# Patient Record
Sex: FEMALE | Race: OTHER | Employment: UNEMPLOYED | URBAN - METROPOLITAN AREA
[De-identification: names, ages, dates, MRNs, and addresses within clinical notes are randomized per-mention and may not be internally consistent; named-entity substitution may affect disease eponyms.]

---

## 2018-10-06 ENCOUNTER — HOSPITAL ENCOUNTER (EMERGENCY)
Age: 43
Discharge: HOME | End: 2018-10-06

## 2018-10-06 VITALS
RESPIRATION RATE: 14 BRPM | WEIGHT: 261 LBS | HEART RATE: 80 BPM | DIASTOLIC BLOOD PRESSURE: 74 MMHG | OXYGEN SATURATION: 97 % | TEMPERATURE: 98.6 F | SYSTOLIC BLOOD PRESSURE: 112 MMHG

## 2018-10-06 DIAGNOSIS — L50.9 URTICARIA: Primary | ICD-10-CM

## 2018-10-06 PROCEDURE — 99282 EMERGENCY DEPT VISIT SF MDM: CPT

## 2018-10-06 PROCEDURE — 6360000002 HC RX W HCPCS: Performed by: NURSE PRACTITIONER

## 2018-10-06 PROCEDURE — 96372 THER/PROPH/DIAG INJ SC/IM: CPT

## 2018-10-06 RX ORDER — PREDNISONE 10 MG/1
TABLET ORAL
Qty: 30 TABLET | Refills: 0 | Status: SHIPPED | OUTPATIENT
Start: 2018-10-06 | End: 2018-10-06

## 2018-10-06 RX ORDER — DIPHENHYDRAMINE HCL 25 MG
25 CAPSULE ORAL EVERY 6 HOURS PRN
Qty: 20 CAPSULE | Refills: 0 | Status: SHIPPED | OUTPATIENT
Start: 2018-10-06 | End: 2018-10-06

## 2018-10-06 RX ORDER — DIPHENHYDRAMINE HCL 25 MG
25 CAPSULE ORAL EVERY 6 HOURS PRN
Qty: 20 CAPSULE | Refills: 0 | Status: SHIPPED | OUTPATIENT
Start: 2018-10-06 | End: 2019-03-31

## 2018-10-06 RX ORDER — DIPHENHYDRAMINE HCL 25 MG
25 CAPSULE ORAL EVERY 6 HOURS PRN
COMMUNITY
End: 2018-10-06

## 2018-10-06 RX ORDER — PREDNISONE 10 MG/1
TABLET ORAL
Qty: 30 TABLET | Refills: 0 | Status: SHIPPED | OUTPATIENT
Start: 2018-10-06 | End: 2019-03-31

## 2018-10-06 RX ORDER — TRIAMCINOLONE ACETONIDE 40 MG/ML
80 INJECTION, SUSPENSION INTRA-ARTICULAR; INTRAMUSCULAR ONCE
Status: COMPLETED | OUTPATIENT
Start: 2018-10-06 | End: 2018-10-06

## 2018-10-06 RX ADMIN — TRIAMCINOLONE ACETONIDE 80 MG: 40 INJECTION, SUSPENSION INTRA-ARTICULAR; INTRAMUSCULAR at 17:24

## 2018-10-06 ASSESSMENT — ENCOUNTER SYMPTOMS
NAUSEA: 0
SHORTNESS OF BREATH: 0
ABDOMINAL PAIN: 0
THROAT SWELLING: 0
PERI-ORBITAL EDEMA: 0
DIARRHEA: 0
HOARSE VOICE: 0
SORE THROAT: 0
VOMITING: 0
WHEEZING: 0

## 2018-10-06 NOTE — ED PROVIDER NOTES
[address] [facility] ED  SFT  Encounter      CHIEF COMPLAINT       Chief Complaint   Patient presents with    Rash     x 4 days-itchs       Nurses Notes reviewed and I agree except as noted in the HPI. HISTORY OF PRESENT ILLNESS   Keon Meyer is a 36 y.o. female who presents   The history is provided by the patient. Rash   Location:  Face, shoulder/arm, leg, foot and hand  Facial rash location:  Face  Quality: itchiness and redness    Quality: not blistering, not bruising, not burning, not draining, not dry, not painful, not peeling, not scaling, not swelling and not weeping    Severity:  Moderate  Onset quality:  Sudden  Duration:  3 days  Timing:  Constant  Progression:  Spreading  Chronicity:  New  Context: not animal contact, not chemical exposure, not diapers, not eggs, not exposure to similar rash, not food, not hot tub use, not insect bite/sting, not medications, not new detergent/soap, not nuts, not plant contact, not pollen, not pregnancy, not sick contacts and not sun exposure    Relieved by:  None tried  Worsened by:  Nothing  Ineffective treatments:  None tried  Associated symptoms: no abdominal pain, no diarrhea, no fatigue, no fever, no headaches, no hoarse voice, no induration, no joint pain, no myalgias, no nausea, no periorbital edema, no shortness of breath, no sore throat, no throat swelling, no tongue swelling, no URI, not vomiting and not wheezing        REVIEW OF SYSTEMS     Review of Systems   Constitutional: Negative for fatigue and fever. HENT: Negative for hoarse voice and sore throat. Respiratory: Negative for shortness of breath and wheezing. Gastrointestinal: Negative for abdominal pain, diarrhea, nausea and vomiting. Musculoskeletal: Negative for arthralgias and myalgias. Skin: Positive for rash. Neurological: Negative for headaches.        PAST MEDICAL HISTORY         Diagnosis Date    Diabetes mellitus ([address]        SURGICAL HISTORY     Patient  has a past [address] [place] [address]

## 2018-12-07 ENCOUNTER — HOSPITAL ENCOUNTER (EMERGENCY)
Age: 43
Discharge: HOME | End: 2018-12-07

## 2018-12-07 ENCOUNTER — APPOINTMENT (OUTPATIENT)
Dept: GENERAL RADIOLOGY | Age: 43
End: 2018-12-07

## 2018-12-07 VITALS
SYSTOLIC BLOOD PRESSURE: 132 MMHG | HEIGHT: 68 IN | RESPIRATION RATE: 20 BRPM | WEIGHT: 271 LBS | BODY MASS INDEX: 41.07 KG/M2 | DIASTOLIC BLOOD PRESSURE: 71 MMHG | TEMPERATURE: 98.2 F | HEART RATE: 71 BPM | OXYGEN SATURATION: 100 %

## 2018-12-07 DIAGNOSIS — M54.41 ACUTE RIGHT-SIDED LOW BACK PAIN WITH RIGHT-SIDED SCIATICA: ICD-10-CM

## 2018-12-07 DIAGNOSIS — H10.9 CONJUNCTIVITIS OF BOTH EYES, UNSPECIFIED CONJUNCTIVITIS TYPE: Primary | ICD-10-CM

## 2018-12-07 PROCEDURE — 6370000000 HC RX 637 (ALT 250 FOR IP): Performed by: PHYSICIAN ASSISTANT

## 2018-12-07 PROCEDURE — 96372 THER/PROPH/DIAG INJ SC/IM: CPT

## 2018-12-07 PROCEDURE — 6360000002 HC RX W HCPCS: Performed by: PHYSICIAN ASSISTANT

## 2018-12-07 PROCEDURE — 72110 X-RAY EXAM L-2 SPINE 4/>VWS: CPT

## 2018-12-07 PROCEDURE — 99283 EMERGENCY DEPT VISIT LOW MDM: CPT

## 2018-12-07 RX ORDER — CYCLOBENZAPRINE HCL 10 MG
10 TABLET ORAL 2 TIMES DAILY PRN
Qty: 10 TABLET | Refills: 0 | Status: SHIPPED | OUTPATIENT
Start: 2018-12-07 | End: 2018-12-17

## 2018-12-07 RX ORDER — HYDROCODONE BITARTRATE AND ACETAMINOPHEN 5; 325 MG/1; MG/1
1 TABLET ORAL ONCE
Status: COMPLETED | OUTPATIENT
Start: 2018-12-07 | End: 2018-12-07

## 2018-12-07 RX ORDER — ORPHENADRINE CITRATE 30 MG/ML
60 INJECTION INTRAMUSCULAR; INTRAVENOUS ONCE
Status: COMPLETED | OUTPATIENT
Start: 2018-12-07 | End: 2018-12-07

## 2018-12-07 RX ORDER — KETOROLAC TROMETHAMINE 30 MG/ML
30 INJECTION, SOLUTION INTRAMUSCULAR; INTRAVENOUS ONCE
Status: COMPLETED | OUTPATIENT
Start: 2018-12-07 | End: 2018-12-07

## 2018-12-07 RX ORDER — IBUPROFEN 800 MG/1
800 TABLET ORAL EVERY 8 HOURS PRN
Qty: 20 TABLET | Refills: 0 | Status: SHIPPED | OUTPATIENT
Start: 2018-12-07 | End: 2019-03-31

## 2018-12-07 RX ORDER — TOBRAMYCIN 3 MG/ML
1 SOLUTION/ DROPS OPHTHALMIC EVERY 4 HOURS
Qty: 1 BOTTLE | Refills: 0 | Status: SHIPPED | OUTPATIENT
Start: 2018-12-07 | End: 2018-12-17

## 2018-12-07 RX ADMIN — HYDROCODONE BITARTRATE AND ACETAMINOPHEN 1 TABLET: 5; 325 TABLET ORAL at 11:34

## 2018-12-07 RX ADMIN — ORPHENADRINE CITRATE 60 MG: 60 INJECTION INTRAMUSCULAR; INTRAVENOUS at 11:35

## 2018-12-07 RX ADMIN — KETOROLAC TROMETHAMINE 30 MG: 30 INJECTION, SOLUTION INTRAMUSCULAR at 11:34

## 2018-12-07 ASSESSMENT — PAIN DESCRIPTION - ORIENTATION
ORIENTATION: RIGHT;LEFT
ORIENTATION: RIGHT;LEFT

## 2018-12-07 ASSESSMENT — ENCOUNTER SYMPTOMS
RHINORRHEA: 0
ABDOMINAL PAIN: 0
NAUSEA: 0
EYE PAIN: 0
SORE THROAT: 0
EYE DISCHARGE: 1
BACK PAIN: 1
EYE ITCHING: 1
COUGH: 0
DIARRHEA: 0
VOMITING: 0
EYE REDNESS: 1
SHORTNESS OF BREATH: 0
PHOTOPHOBIA: 0

## 2018-12-07 ASSESSMENT — PAIN DESCRIPTION - PAIN TYPE
TYPE: ACUTE PAIN
TYPE: ACUTE PAIN

## 2018-12-07 ASSESSMENT — PAIN DESCRIPTION - PROGRESSION: CLINICAL_PROGRESSION: GRADUALLY IMPROVING

## 2018-12-07 ASSESSMENT — PAIN DESCRIPTION - ONSET: ONSET: ON-GOING

## 2018-12-07 ASSESSMENT — PAIN DESCRIPTION - FREQUENCY
FREQUENCY: CONTINUOUS
FREQUENCY: INTERMITTENT

## 2018-12-07 ASSESSMENT — PAIN DESCRIPTION - DESCRIPTORS
DESCRIPTORS: ACHING;SHARP;ITCHING
DESCRIPTORS: ACHING

## 2018-12-07 ASSESSMENT — PAIN SCALES - GENERAL
PAINLEVEL_OUTOF10: 4
PAINLEVEL_OUTOF10: 7

## 2018-12-07 ASSESSMENT — PAIN DESCRIPTION - LOCATION
LOCATION: BACK;EYE
LOCATION: BACK

## 2018-12-07 NOTE — ED PROVIDER NOTES
returned as of this dictation. EMERGENCY DEPARTMENT COURSE and DIFFERENTIALDIAGNOSIS/MDM:   Vitals:    Vitals:    12/07/18 1119 12/07/18 1200 12/07/18 1250   BP: 118/64 124/68 132/71   Pulse: 66 71 71   Resp: 18 20 20   Temp: 98.8 °F (37.1 °C)  98.2 °F (36.8 °C)   TempSrc: Oral  Oral   SpO2: 100% 100% 100%   Weight: 271 lb (122.9 kg)     Height: 5' 8\" (1.727 m)              Nontoxic and no acute distress. Xray negative. Treated in ed for pain. No neuro of motor deficits. Will treat conjunctivits with tobrex and back pain with flexeril and motrin. Advised to f/u with her family physician in two days and return to ed for new or worsening symptoms. Stable and ready for discharge. PROCEDURES:  Unless otherwise noted below, none     Procedures      FINAL IMPRESSION      1. Conjunctivitis of both eyes, unspecified conjunctivitis type    2.  Acute right-sided low back pain with right-sided sciatica          DISPOSITION/PLAN   DISPOSITION Decision To Discharge 12/07/2018 12:18:36 PM          Feng Valle PA-C (electronically signed)  Attending Emergency Physician       Feng Valle PA-C  12/07/18 0095

## 2018-12-19 ENCOUNTER — APPOINTMENT (OUTPATIENT)
Dept: CT IMAGING | Age: 43
End: 2018-12-19

## 2018-12-19 ENCOUNTER — HOSPITAL ENCOUNTER (EMERGENCY)
Age: 43
Discharge: HOME | End: 2018-12-19

## 2018-12-19 VITALS
BODY MASS INDEX: 46.26 KG/M2 | SYSTOLIC BLOOD PRESSURE: 127 MMHG | TEMPERATURE: 98 F | HEART RATE: 60 BPM | OXYGEN SATURATION: 99 % | HEIGHT: 64 IN | RESPIRATION RATE: 18 BRPM | DIASTOLIC BLOOD PRESSURE: 82 MMHG | WEIGHT: 271 LBS

## 2018-12-19 DIAGNOSIS — R10.9 ABDOMINAL CRAMPING: Primary | ICD-10-CM

## 2018-12-19 DIAGNOSIS — R19.7 NAUSEA VOMITING AND DIARRHEA: ICD-10-CM

## 2018-12-19 DIAGNOSIS — R11.2 NAUSEA VOMITING AND DIARRHEA: ICD-10-CM

## 2018-12-19 LAB
ALBUMIN SERPL-MCNC: 4 G/DL (ref 3.9–4.9)
ALP BLD-CCNC: 53 U/L (ref 40–130)
ALT SERPL-CCNC: 9 U/L (ref 0–33)
ANION GAP SERPL CALCULATED.3IONS-SCNC: 11 MEQ/L (ref 7–13)
AST SERPL-CCNC: 12 U/L (ref 0–35)
BACTERIA: NEGATIVE /HPF
BASOPHILS ABSOLUTE: 0.1 K/UL (ref 0–0.2)
BASOPHILS RELATIVE PERCENT: 0.7 %
BILIRUB SERPL-MCNC: 0.3 MG/DL (ref 0–1.2)
BILIRUBIN URINE: NEGATIVE
BLOOD, URINE: ABNORMAL
BUN BLDV-MCNC: 8 MG/DL (ref 6–20)
CALCIUM SERPL-MCNC: 8.7 MG/DL (ref 8.6–10.2)
CHLORIDE BLD-SCNC: 103 MEQ/L (ref 98–107)
CHP ED QC CHECK: YES
CHP ED QC CHECK: YES
CLARITY: CLEAR
CO2: 28 MEQ/L (ref 22–29)
COLOR: YELLOW
CREAT SERPL-MCNC: 0.75 MG/DL (ref 0.5–0.9)
EKG ATRIAL RATE: 57 BPM
EKG P AXIS: 15 DEGREES
EKG P-R INTERVAL: 134 MS
EKG Q-T INTERVAL: 440 MS
EKG QRS DURATION: 72 MS
EKG QTC CALCULATION (BAZETT): 428 MS
EKG R AXIS: 1 DEGREES
EKG T AXIS: 7 DEGREES
EKG VENTRICULAR RATE: 57 BPM
EOSINOPHILS ABSOLUTE: 0.1 K/UL (ref 0–0.7)
EOSINOPHILS RELATIVE PERCENT: 1 %
EPITHELIAL CELLS, UA: ABNORMAL /HPF (ref 0–5)
GFR AFRICAN AMERICAN: >60
GFR NON-AFRICAN AMERICAN: >60
GLOBULIN: 3.1 G/DL (ref 2.3–3.5)
GLUCOSE BLD-MCNC: 100 MG/DL
GLUCOSE BLD-MCNC: 100 MG/DL (ref 60–115)
GLUCOSE BLD-MCNC: 95 MG/DL (ref 74–109)
GLUCOSE URINE: NEGATIVE MG/DL
HCT VFR BLD CALC: 36.8 % (ref 37–47)
HEMOGLOBIN: 12.2 G/DL (ref 12–16)
HYALINE CASTS: ABNORMAL /HPF (ref 0–5)
KETONES, URINE: NEGATIVE MG/DL
LEUKOCYTE ESTERASE, URINE: NEGATIVE
LIPASE: 14 U/L (ref 13–60)
LYMPHOCYTES ABSOLUTE: 2 K/UL (ref 1–4.8)
LYMPHOCYTES RELATIVE PERCENT: 18.7 %
MCH RBC QN AUTO: 27.8 PG (ref 27–31.3)
MCHC RBC AUTO-ENTMCNC: 33 % (ref 33–37)
MCV RBC AUTO: 84.3 FL (ref 82–100)
MONOCYTES ABSOLUTE: 0.5 K/UL (ref 0.2–0.8)
MONOCYTES RELATIVE PERCENT: 4.4 %
NEUTROPHILS ABSOLUTE: 8 K/UL (ref 1.4–6.5)
NEUTROPHILS RELATIVE PERCENT: 75.2 %
NITRITE, URINE: NEGATIVE
PDW BLD-RTO: 14.7 % (ref 11.5–14.5)
PERFORMED ON: NORMAL
PH UA: 7 (ref 5–9)
PLATELET # BLD: 280 K/UL (ref 130–400)
POTASSIUM SERPL-SCNC: 4.1 MEQ/L (ref 3.5–5.1)
PREGNANCY TEST URINE, POC: NEGATIVE
PROTEIN UA: NEGATIVE MG/DL
RBC # BLD: 4.37 M/UL (ref 4.2–5.4)
RBC UA: ABNORMAL /HPF (ref 0–5)
SODIUM BLD-SCNC: 142 MEQ/L (ref 132–144)
SPECIFIC GRAVITY UA: 1.02 (ref 1–1.03)
TOTAL PROTEIN: 7.1 G/DL (ref 6.4–8.1)
URINE REFLEX TO CULTURE: YES
UROBILINOGEN, URINE: 0.2 E.U./DL
WBC # BLD: 10.6 K/UL (ref 4.8–10.8)
WBC UA: ABNORMAL /HPF (ref 0–5)

## 2018-12-19 PROCEDURE — 96374 THER/PROPH/DIAG INJ IV PUSH: CPT

## 2018-12-19 PROCEDURE — 6360000002 HC RX W HCPCS: Performed by: PERSONAL EMERGENCY RESPONSE ATTENDANT

## 2018-12-19 PROCEDURE — 83690 ASSAY OF LIPASE: CPT

## 2018-12-19 PROCEDURE — 96375 TX/PRO/DX INJ NEW DRUG ADDON: CPT

## 2018-12-19 PROCEDURE — 99284 EMERGENCY DEPT VISIT MOD MDM: CPT

## 2018-12-19 PROCEDURE — 85025 COMPLETE CBC W/AUTO DIFF WBC: CPT

## 2018-12-19 PROCEDURE — 93005 ELECTROCARDIOGRAM TRACING: CPT

## 2018-12-19 PROCEDURE — 36415 COLL VENOUS BLD VENIPUNCTURE: CPT

## 2018-12-19 PROCEDURE — 80053 COMPREHEN METABOLIC PANEL: CPT

## 2018-12-19 PROCEDURE — 81001 URINALYSIS AUTO W/SCOPE: CPT

## 2018-12-19 PROCEDURE — 70450 CT HEAD/BRAIN W/O DYE: CPT

## 2018-12-19 PROCEDURE — 87086 URINE CULTURE/COLONY COUNT: CPT

## 2018-12-19 PROCEDURE — 2580000003 HC RX 258: Performed by: PERSONAL EMERGENCY RESPONSE ATTENDANT

## 2018-12-19 RX ORDER — ONDANSETRON 4 MG/1
4 TABLET, ORALLY DISINTEGRATING ORAL EVERY 8 HOURS PRN
Qty: 20 TABLET | Refills: 0 | Status: SHIPPED | OUTPATIENT
Start: 2018-12-19 | End: 2020-12-20

## 2018-12-19 RX ORDER — KETOROLAC TROMETHAMINE 30 MG/ML
30 INJECTION, SOLUTION INTRAMUSCULAR; INTRAVENOUS ONCE
Status: COMPLETED | OUTPATIENT
Start: 2018-12-19 | End: 2018-12-19

## 2018-12-19 RX ORDER — 0.9 % SODIUM CHLORIDE 0.9 %
1000 INTRAVENOUS SOLUTION INTRAVENOUS ONCE
Status: COMPLETED | OUTPATIENT
Start: 2018-12-19 | End: 2018-12-19

## 2018-12-19 RX ORDER — ONDANSETRON 2 MG/ML
4 INJECTION INTRAMUSCULAR; INTRAVENOUS ONCE
Status: COMPLETED | OUTPATIENT
Start: 2018-12-19 | End: 2018-12-19

## 2018-12-19 RX ADMIN — ONDANSETRON 4 MG: 2 INJECTION INTRAMUSCULAR; INTRAVENOUS at 20:11

## 2018-12-19 RX ADMIN — KETOROLAC TROMETHAMINE 30 MG: 30 INJECTION, SOLUTION INTRAMUSCULAR at 20:11

## 2018-12-19 RX ADMIN — SODIUM CHLORIDE 1000 ML: 9 INJECTION, SOLUTION INTRAVENOUS at 20:11

## 2018-12-19 ASSESSMENT — PAIN SCALES - GENERAL
PAINLEVEL_OUTOF10: 10
PAINLEVEL_OUTOF10: 2
PAINLEVEL_OUTOF10: 8

## 2018-12-19 ASSESSMENT — PAIN DESCRIPTION - PAIN TYPE: TYPE: ACUTE PAIN

## 2018-12-19 ASSESSMENT — ENCOUNTER SYMPTOMS
COLOR CHANGE: 0
VOMITING: 1
DIARRHEA: 1
COUGH: 0
SHORTNESS OF BREATH: 0
BLOOD IN STOOL: 0
SORE THROAT: 0
RHINORRHEA: 0
ABDOMINAL PAIN: 1
NAUSEA: 1

## 2018-12-19 ASSESSMENT — PAIN DESCRIPTION - LOCATION
LOCATION: ABDOMEN
LOCATION: ABDOMEN

## 2018-12-19 ASSESSMENT — PAIN DESCRIPTION - DESCRIPTORS: DESCRIPTORS: SQUEEZING

## 2018-12-19 ASSESSMENT — PAIN DESCRIPTION - PROGRESSION: CLINICAL_PROGRESSION: RAPIDLY IMPROVING

## 2018-12-19 ASSESSMENT — PAIN DESCRIPTION - FREQUENCY: FREQUENCY: INTERMITTENT

## 2018-12-19 ASSESSMENT — PAIN DESCRIPTION - ORIENTATION: ORIENTATION: MID

## 2018-12-20 NOTE — ED PROVIDER NOTES
[facility] [facility] ED  eMERGENCY dEPARTMENT eNCOUnter      Pt Name: Cristiane Novak  MRN: [id number]  [place] 1975  Date of evaluation: 12/19/2018  Provider: ROSY Cooper      HISTORY OF PRESENT ILLNESS    Cristiane Novak is a 39 y.o. female with PMHx of DM2 presents to the emergency department with dizziness. Pt states 2 hour ago she Started with intermittent dizziness with the room spinning and lightheaded sensation. She vomited 3 times while here. She does have bilateral blurry vision. She does complain of headache. She did have 4 episodes of watery diarrhea this morning which have subsided. She does complain of periumbilical abdominal cramping that started today as well. She has not been checking her blood sugars at home and only took Amaryl today and no metformin. She states because her stomach was upset she did not want to take the metformin. She denies fevers, ill contacts, head injuries, chest pain, shortness of breath, coughing, urinary symptoms. Patient is on her menstrual cycle. She has been on it since December 7. Her normal menstrual cycles are 4-5 days. However she states a couple months ago she also had a prolonged menstrual cycle. HPI    Nursing Notes were reviewed. REVIEW OF SYSTEMS       Review of Systems   Constitutional: Negative for appetite change, chills and fever. HENT: Negative for congestion, rhinorrhea and sore throat. Eyes: Positive for visual disturbance. Respiratory: Negative for cough and shortness of breath. Cardiovascular: Negative for chest pain. Gastrointestinal: Positive for abdominal pain, diarrhea, nausea and vomiting. Negative for blood in stool. Genitourinary: Negative for difficulty urinating. Musculoskeletal: Negative for neck stiffness. Skin: Negative for color change and rash. Neurological: Positive for dizziness, light-headedness and headaches. Negative for syncope, weakness and numbness.    All other METABOLIC PANEL   LIPASE   URINE RT REFLEX TO CULTURE   POCT GLUCOSE       All other labs were within normal range or not returned as of this dictation. EMERGENCY DEPARTMENT COURSE and DIFFERENTIAL DIAGNOSIS/MDM:   Vitals:    Vitals:    12/19/18 1828 12/19/18 2014   BP: 131/78 128/80   Pulse: 58 60   Resp: 18 18   Temp: 97.9 °F (36.6 °C) 97.9 °F (36.6 °C)   TempSrc: Oral Oral   SpO2: 98% 99%   Weight: 271 lb (122.9 kg)    Height: 5' 4\" (1.626 m)          MDM    CT head shows no acute process. Blood work is unremarkable. Patient states her symptoms have improved in the room. She appears nontoxic laughing and smiling with family in the room. Urine shows no infection. Pt is aware to stick to a BRAT diet at home and keep herself hydrated. Standard anticipatory guidance given to patient upon discharge. Have given them a specific time frame in which to follow-up and who to follow-up with. I have also advised them that they should return to the emergency department if they get worse, or not getting better or develop any new or concerning symptoms. Patient demonstrates understanding. CRITICAL CARE TIME   Total Critical Caretime was 0 minutes, excluding separately reportable procedures. There was a high probability of clinically significant/life threatening deterioration in the patient's condition which required my urgent intervention. Procedures    FINAL IMPRESSION      1. Abdominal cramping    2.  Nausea vomiting and diarrhea          DISPOSITION/PLAN   DISPOSITION Discharge - Pending Orders Complete 12/19/2018 08:32:49 PM      PATIENT REFERRED TO:  [facility]  [address] [place] [address] Kiah Prieto  In 2 days      Branden Garcia  [address] [place], [place] [id number] [phone]    Call today  to follow up concerning abnormal periods      DISCHARGE MEDICATIONS:  New Prescriptions    ONDANSETRON (ZOFRAN ODT) 4 MG DISINTEGRATING TABLET    Take 1 tablet by mouth every 8 hours as needed for Nausea          (Please notethat portions of this note were completed with a voice recognition program.  Efforts were made to edit the dictations but occasionally words are mis-transcribed. )    ROSY Santamaria (electronically signed)  Emergency Physician Assistant         Arabella Anma  12/19/18 7510

## 2018-12-21 LAB — URINE CULTURE, ROUTINE: NORMAL

## 2018-12-21 PROCEDURE — 93010 ELECTROCARDIOGRAM REPORT: CPT | Performed by: INTERNAL MEDICINE

## 2019-02-07 ENCOUNTER — APPOINTMENT (OUTPATIENT)
Dept: CT IMAGING | Age: 44
End: 2019-02-07

## 2019-02-07 ENCOUNTER — HOSPITAL ENCOUNTER (EMERGENCY)
Age: 44
Discharge: HOME | End: 2019-02-07
Attending: EMERGENCY MEDICINE

## 2019-02-07 VITALS
TEMPERATURE: 98.4 F | DIASTOLIC BLOOD PRESSURE: 78 MMHG | BODY MASS INDEX: 38.04 KG/M2 | HEIGHT: 68 IN | OXYGEN SATURATION: 96 % | WEIGHT: 251 LBS | RESPIRATION RATE: 17 BRPM | HEART RATE: 92 BPM | SYSTOLIC BLOOD PRESSURE: 124 MMHG

## 2019-02-07 DIAGNOSIS — K42.9 UMBILICAL HERNIA WITHOUT OBSTRUCTION AND WITHOUT GANGRENE: Primary | ICD-10-CM

## 2019-02-07 LAB
BACTERIA: NEGATIVE /HPF
BILIRUBIN URINE: NEGATIVE
BLOOD, URINE: ABNORMAL
CLARITY: CLEAR
COLOR: ABNORMAL
EPITHELIAL CELLS, UA: ABNORMAL /HPF (ref 0–5)
GLUCOSE URINE: NEGATIVE MG/DL
HCG, URINE, POC: NEGATIVE
HYALINE CASTS: ABNORMAL /HPF (ref 0–5)
KETONES, URINE: ABNORMAL MG/DL
LEUKOCYTE ESTERASE, URINE: NEGATIVE
Lab: NORMAL
MUCUS: PRESENT
NEGATIVE QC PASS/FAIL: NORMAL
NITRITE, URINE: NEGATIVE
PH UA: 5 (ref 5–9)
POSITIVE QC PASS/FAIL: NORMAL
PROTEIN UA: NEGATIVE MG/DL
RBC UA: ABNORMAL /HPF (ref 0–5)
SPECIFIC GRAVITY UA: 1.03 (ref 1–1.03)
URINE REFLEX TO CULTURE: YES
UROBILINOGEN, URINE: 0.2 E.U./DL
WBC UA: ABNORMAL /HPF (ref 0–5)

## 2019-02-07 PROCEDURE — 87086 URINE CULTURE/COLONY COUNT: CPT

## 2019-02-07 PROCEDURE — 81001 URINALYSIS AUTO W/SCOPE: CPT

## 2019-02-07 PROCEDURE — 6370000000 HC RX 637 (ALT 250 FOR IP): Performed by: EMERGENCY MEDICINE

## 2019-02-07 PROCEDURE — 74176 CT ABD & PELVIS W/O CONTRAST: CPT

## 2019-02-07 PROCEDURE — 99283 EMERGENCY DEPT VISIT LOW MDM: CPT

## 2019-02-07 RX ORDER — NAPROXEN 500 MG/1
500 TABLET ORAL 2 TIMES DAILY WITH MEALS
Qty: 30 TABLET | Refills: 0 | Status: SHIPPED | OUTPATIENT
Start: 2019-02-07 | End: 2019-03-31

## 2019-02-07 RX ORDER — NAPROXEN 500 MG/1
500 TABLET ORAL ONCE
Status: COMPLETED | OUTPATIENT
Start: 2019-02-07 | End: 2019-02-07

## 2019-02-07 RX ADMIN — NAPROXEN 500 MG: 500 TABLET ORAL at 17:41

## 2019-02-07 ASSESSMENT — ENCOUNTER SYMPTOMS
CHEST TIGHTNESS: 0
SORE THROAT: 0
VOMITING: 0
SHORTNESS OF BREATH: 0
EYE PAIN: 0
NAUSEA: 0
ABDOMINAL PAIN: 1

## 2019-02-07 ASSESSMENT — PAIN DESCRIPTION - PAIN TYPE
TYPE: ACUTE PAIN
TYPE: ACUTE PAIN

## 2019-02-07 ASSESSMENT — PAIN DESCRIPTION - LOCATION: LOCATION: BACK;HEAD;OTHER (COMMENT)

## 2019-02-07 ASSESSMENT — PAIN SCALES - GENERAL
PAINLEVEL_OUTOF10: 4
PAINLEVEL_OUTOF10: 8
PAINLEVEL_OUTOF10: 8

## 2019-02-07 ASSESSMENT — PAIN DESCRIPTION - FREQUENCY
FREQUENCY: CONTINUOUS
FREQUENCY: CONTINUOUS

## 2019-02-07 ASSESSMENT — PAIN DESCRIPTION - DESCRIPTORS
DESCRIPTORS: BURNING;ACHING
DESCRIPTORS: ACHING;BURNING

## 2019-02-09 LAB — URINE CULTURE, ROUTINE: NORMAL

## 2019-03-31 ENCOUNTER — HOSPITAL ENCOUNTER (EMERGENCY)
Age: 44
Discharge: HOME | End: 2019-03-31
Attending: STUDENT IN AN ORGANIZED HEALTH CARE EDUCATION/TRAINING PROGRAM

## 2019-03-31 VITALS
SYSTOLIC BLOOD PRESSURE: 151 MMHG | TEMPERATURE: 98.3 F | HEIGHT: 68 IN | DIASTOLIC BLOOD PRESSURE: 114 MMHG | OXYGEN SATURATION: 100 % | HEART RATE: 65 BPM | WEIGHT: 251 LBS | BODY MASS INDEX: 38.04 KG/M2 | RESPIRATION RATE: 20 BRPM

## 2019-03-31 DIAGNOSIS — S02.5XXB OPEN FRACTURE OF TOOTH, INITIAL ENCOUNTER: ICD-10-CM

## 2019-03-31 DIAGNOSIS — K02.9 DENTAL CARIES: Primary | ICD-10-CM

## 2019-03-31 DIAGNOSIS — F17.200 TOBACCO DEPENDENCE: ICD-10-CM

## 2019-03-31 PROCEDURE — 6370000000 HC RX 637 (ALT 250 FOR IP): Performed by: STUDENT IN AN ORGANIZED HEALTH CARE EDUCATION/TRAINING PROGRAM

## 2019-03-31 PROCEDURE — 99282 EMERGENCY DEPT VISIT SF MDM: CPT

## 2019-03-31 RX ORDER — NAPROXEN 500 MG/1
500 TABLET ORAL ONCE
Status: COMPLETED | OUTPATIENT
Start: 2019-03-31 | End: 2019-03-31

## 2019-03-31 RX ORDER — PENICILLIN V POTASSIUM 250 MG/1
500 TABLET ORAL ONCE
Status: COMPLETED | OUTPATIENT
Start: 2019-03-31 | End: 2019-03-31

## 2019-03-31 RX ORDER — PENICILLIN V POTASSIUM 500 MG/1
500 TABLET ORAL 4 TIMES DAILY
Qty: 40 TABLET | Refills: 0 | Status: SHIPPED | OUTPATIENT
Start: 2019-03-31 | End: 2019-04-10

## 2019-03-31 RX ORDER — TRAMADOL HYDROCHLORIDE 50 MG/1
50 TABLET ORAL EVERY 6 HOURS PRN
Qty: 12 TABLET | Refills: 0 | Status: SHIPPED | OUTPATIENT
Start: 2019-03-31 | End: 2019-04-03

## 2019-03-31 RX ORDER — NAPROXEN 500 MG/1
500 TABLET ORAL 2 TIMES DAILY
Qty: 20 TABLET | Refills: 0 | Status: SHIPPED | OUTPATIENT
Start: 2019-03-31 | End: 2019-07-27 | Stop reason: ALTCHOICE

## 2019-03-31 RX ADMIN — NAPROXEN 500 MG: 500 TABLET ORAL at 11:44

## 2019-03-31 RX ADMIN — PENICILLIN V POTASIUM 500 MG: 250 TABLET ORAL at 11:44

## 2019-03-31 ASSESSMENT — PAIN DESCRIPTION - PAIN TYPE: TYPE: ACUTE PAIN

## 2019-03-31 ASSESSMENT — PAIN DESCRIPTION - ONSET: ONSET: ON-GOING

## 2019-03-31 ASSESSMENT — ENCOUNTER SYMPTOMS
BACK PAIN: 0
TROUBLE SWALLOWING: 0
CHEST TIGHTNESS: 0
SHORTNESS OF BREATH: 0
SINUS PRESSURE: 0
DIARRHEA: 0
ABDOMINAL PAIN: 0
FACIAL SWELLING: 1
VOMITING: 0
COUGH: 0

## 2019-03-31 ASSESSMENT — PAIN SCALES - GENERAL
PAINLEVEL_OUTOF10: 6
PAINLEVEL_OUTOF10: 6

## 2019-03-31 ASSESSMENT — PAIN DESCRIPTION - DESCRIPTORS: DESCRIPTORS: ACHING

## 2019-03-31 ASSESSMENT — PAIN DESCRIPTION - FREQUENCY: FREQUENCY: INTERMITTENT

## 2019-03-31 ASSESSMENT — PAIN DESCRIPTION - PROGRESSION: CLINICAL_PROGRESSION: GRADUALLY WORSENING

## 2019-03-31 ASSESSMENT — PAIN DESCRIPTION - LOCATION: LOCATION: FACE

## 2019-03-31 NOTE — ED TRIAGE NOTES
Patient arrived from home with complaint of left side facial swelling since last night. Patient A&OX3. Left upper tooth decay noted. Skin pink, warm, and dry. Left side facial swelling noted.

## 2019-03-31 NOTE — ED PROVIDER NOTES
[address] [facility] ED  eMERGENCY dEPARTMENT eNCOUnter      Pt Name: Peewee Dawson  MRN: [id number]  [place] 1975  Date of evaluation: 3/31/2019  Provider: Kinza Griffin, [address] [place] [address]       Chief Complaint   Patient presents with    Facial Swelling     left side of face. upper dental pain left side. HISTORY OF PRESENT ILLNESS   (Location/Symptom, Timing/Onset,Context/Setting, Quality, Duration, Modifying Factors, Severity)  Note limiting factors. Peewee Dawson is a 39 y.o. female who presents to the emergency department with c/o tooth pain and swelling to left cheek. Patient denies fever, chills, drooling. Patient denies N/V. Symptoms started last night. The patient and her daughter present. The daughter is with a male . I the patient's permission to interview, examine her, and discuss her care with them present. The history is provided by the patient, a relative and a friend. NursingNotes were reviewed. REVIEW OF SYSTEMS    (2-9 systems for level 4, 10 or more for level 5)     Review of Systems   Constitutional: Negative for activity change, appetite change, chills, fever and unexpected weight change. HENT: Positive for dental problem and facial swelling. Negative for drooling, ear pain, nosebleeds, sinus pressure and trouble swallowing. Respiratory: Negative for cough, chest tightness and shortness of breath. Cardiovascular: Negative for chest pain and leg swelling. Gastrointestinal: Negative for abdominal pain, diarrhea and vomiting. Endocrine: Negative for polydipsia and polyphagia. Genitourinary: Negative for dysuria, flank pain and frequency. Musculoskeletal: Negative for back pain and myalgias. Skin: Negative for pallor and rash. Neurological: Negative for syncope, weakness and headaches. Hematological: Does not bruise/bleed easily. All other systems reviewed and are negative.       Except as noted above the remainder of the review of systems was reviewed and negative. PAST MEDICAL HISTORY     Past Medical History:   Diagnosis Date    Diabetes mellitus ([facility] [address] [address]          SURGICALHISTORY       Past Surgical History:   Procedure Laterality Date    HERNIA REPAIR           CURRENT MEDICATIONS       Previous Medications    METFORMIN (GLUCOPHAGE) 500 MG TABLET    Take 500 mg by mouth daily (with breakfast)    ONDANSETRON (ZOFRAN ODT) 4 MG DISINTEGRATING TABLET    Take 1 tablet by mouth every 8 hours as needed for Nausea       ALLERGIES     Shrimp (diagnostic)    FAMILY HISTORY     History reviewed. No pertinent family history.        SOCIAL HISTORY       Social History     Socioeconomic History    Marital status: Single     Spouse name: None    Number of children: None    Years of education: None    Highest education level: None   Occupational History    None   Social Needs    Financial resource strain: None    Food insecurity:     Worry: None     Inability: None    Transportation needs:     Medical: None     Non-medical: None   Tobacco Use    Smoking status: Current Every Day Smoker    Smokeless tobacco: Never Used   Substance and Sexual Activity    Alcohol use: No    Drug use: No    Sexual activity: None   Lifestyle    Physical activity:     Days per week: None     Minutes per session: None    Stress: None   Relationships    Social connections:     Talks on phone: None     Gets together: None     Attends Taoist service: None     Active member of club or organization: None     Attends meetings of clubs or organizations: None     Relationship status: None    Intimate partner violence:     Fear of current or ex partner: None     Emotionally abused: None     Physically abused: None     Forced sexual activity: None   Other Topics Concern    None   Social History Narrative    None       SCREENINGS      @FLOW([id number])@      PHYSICAL EXAM    (up to 7 for level 4, 8 or more for level 5)     ED Triage Vitals [03/31/19 1052]   BP Temp Temp Source Pulse Resp SpO2 Height Weight   (!) 151/114 98.3 °F (36.8 °C) Oral 65 20 100 % 5' 8\" (1.727 m) 251 lb (113.9 kg)       Physical Exam   Constitutional: She is oriented to person, place, and time. She appears well-developed and well-nourished. No distress. HENT:   Head: Normocephalic and atraumatic. Head is without Melgar's sign. Right Ear: External ear normal.   Left Ear: External ear normal.   Nose: Nose normal.   Mouth/Throat: Uvula is midline and oropharynx is clear and moist. No trismus in the jaw. Dental caries present. No dental abscesses. No oropharyngeal exudate. Eyes: Pupils are equal, round, and reactive to light. Conjunctivae and EOM are normal. Right eye exhibits no discharge. No foreign body present in the right eye. Left eye exhibits no discharge and no exudate. No scleral icterus. Neck: Normal range of motion. Neck supple. No JVD present. No neck rigidity. No tracheal deviation present. No thyromegaly present. Cardiovascular: Normal rate, regular rhythm, normal heart sounds and intact distal pulses. Exam reveals no gallop, no distant heart sounds and no friction rub. No murmur heard. Pulmonary/Chest: Effort normal and breath sounds normal. No stridor. No respiratory distress. She has no wheezes. She has no rales. She exhibits no tenderness. Abdominal: Soft. Bowel sounds are normal. She exhibits no distension, no pulsatile liver, no ascites and no mass. There is no hepatosplenomegaly. There is no tenderness. There is no rebound and no guarding. Musculoskeletal: Normal range of motion. She exhibits no edema or tenderness. Lymphadenopathy:        Head (right side): No submental adenopathy present. Head (left side): No submental adenopathy present. Neurological: She is alert and oriented to person, place, and time. She has normal reflexes. No cranial nerve deficit. Coordination normal.   Skin: Skin is warm and dry.  Capillary refill takes less than 2 seconds. No rash noted. She is not diaphoretic. No erythema. Psychiatric: She has a normal mood and affect. Her behavior is normal. Judgment and thought content normal.   Nursing note and vitals reviewed. DIAGNOSTIC RESULTS     EKG: All EKG's are interpreted by the Emergency Department Physician who either signs or Co-signsthis chart in the absence of a cardiologist.        RADIOLOGY:   [facility] such as CT, Ultrasound and MRI are read by the radiologist. Plain radiographic images are visualized and preliminarily interpreted by the emergency physician with the below findings:        Interpretation per the Radiologist below, if available at the time ofthis note:    No orders to display         ED BEDSIDE ULTRASOUND:   Performed by ED Physician - none    LABS:  Labs Reviewed - No data to display    All other labs were within normal range or not returned as of this dictation. EMERGENCY DEPARTMENT COURSE and DIFFERENTIAL DIAGNOSIS/MDM:   Vitals:    Vitals:    03/31/19 1052   BP: (!) 151/114   Pulse: 65   Resp: 20   Temp: 98.3 °F (36.8 °C)   TempSrc: Oral   SpO2: 100%   Weight: 251 lb (113.9 kg)   Height: 5' 8\" (1.727 m)           MDM  No pain with eye movement. She has the slightest amount of edema to the left cheek. Patient has extensive dental decay and fractured teeth with gray discoloration of some teeth that are intact. Percussion over these teeth and gum causes pain to the cheek. Patient was started on Pen-Vee K and Naprosyn. Patient was instructed that she will need to follow up with a dentist.    At [facility] advised the patient to quit smoking. CONSULTS:  None    PROCEDURES:  Unless otherwise noted below, none     Procedures    FINAL IMPRESSION      1. Dental caries    2. Open fracture of tooth, initial encounter    3.  Tobacco dependence          DISPOSITION/PLAN   DISPOSITION Discharge - Pending Orders Complete 03/31/2019 11:37:35 AM      PATIENT REFERRED TO:  [facility]  [address] [place]  [phone]  Schedule an appointment as soon as possible for a visit in 1 day        DISCHARGE MEDICATIONS:  New Prescriptions    LIDOCAINE VISCOUS (XYLOCAINE) 2 % SOLUTION    Take 15 mLs by mouth every 3 hours as needed for Dental Pain    NAPROXEN (NAPROSYN) 500 MG TABLET    Take 1 tablet by mouth 2 times daily for 20 doses    PENICILLIN V POTASSIUM (VEETID) 500 MG TABLET    Take 1 tablet by mouth 4 times daily for 10 days    TRAMADOL (ULTRAM) 50 MG TABLET    Take 1 tablet by mouth every 6 hours as needed for Pain for up to 3 days. Intended supply: 3 days.  Take lowest dose possible to manage pain          (Please note that portions of this note were completed with a voice recognition program.  Efforts were made to edit the dictations but occasionally words are mis-transcribed.)    Anoop Garza DO (electronically signed)  Attending Emergency Physician          Anoop Garza DO  04/01/19 1947

## 2019-07-27 ENCOUNTER — HOSPITAL ENCOUNTER (EMERGENCY)
Age: 44
Discharge: HOME | End: 2019-07-27
Attending: EMERGENCY MEDICINE
Payer: COMMERCIAL

## 2019-07-27 VITALS
OXYGEN SATURATION: 95 % | HEART RATE: 76 BPM | DIASTOLIC BLOOD PRESSURE: 73 MMHG | TEMPERATURE: 98.6 F | SYSTOLIC BLOOD PRESSURE: 118 MMHG | HEIGHT: 64 IN | BODY MASS INDEX: 37.56 KG/M2 | WEIGHT: 220 LBS | RESPIRATION RATE: 17 BRPM

## 2019-07-27 DIAGNOSIS — S02.5XXA CLOSED FRACTURE OF TOOTH, INITIAL ENCOUNTER: ICD-10-CM

## 2019-07-27 DIAGNOSIS — K02.9 DENTAL CARIES: Primary | ICD-10-CM

## 2019-07-27 DIAGNOSIS — K08.89 PAIN, DENTAL: ICD-10-CM

## 2019-07-27 PROCEDURE — 99282 EMERGENCY DEPT VISIT SF MDM: CPT

## 2019-07-27 RX ORDER — PENICILLIN V POTASSIUM 500 MG/1
500 TABLET ORAL 4 TIMES DAILY
Qty: 28 TABLET | Refills: 0 | Status: SHIPPED | OUTPATIENT
Start: 2019-07-27 | End: 2019-08-03

## 2019-07-27 RX ORDER — LIDOCAINE HYDROCHLORIDE 20 MG/ML
5 SOLUTION OROPHARYNGEAL
Qty: 1 BOTTLE | Refills: 0 | Status: SHIPPED | OUTPATIENT
Start: 2019-07-27 | End: 2019-08-01

## 2019-07-27 RX ORDER — NAPROXEN 500 MG/1
500 TABLET ORAL 2 TIMES DAILY PRN
Qty: 20 TABLET | Refills: 0 | OUTPATIENT
Start: 2019-07-27 | End: 2021-04-09

## 2019-07-27 ASSESSMENT — ENCOUNTER SYMPTOMS
DIARRHEA: 0
SHORTNESS OF BREATH: 0
BACK PAIN: 0
VOMITING: 0
ABDOMINAL PAIN: 0
SORE THROAT: 0
NAUSEA: 0
TROUBLE SWALLOWING: 0
COUGH: 0

## 2019-07-27 ASSESSMENT — PAIN SCALES - GENERAL: PAINLEVEL_OUTOF10: 8

## 2019-07-27 ASSESSMENT — PAIN DESCRIPTION - LOCATION: LOCATION: MOUTH

## 2019-07-27 ASSESSMENT — PAIN DESCRIPTION - PAIN TYPE: TYPE: ACUTE PAIN

## 2020-09-28 ENCOUNTER — HOSPITAL ENCOUNTER (EMERGENCY)
Age: 45
Discharge: HOME | End: 2020-09-28
Payer: COMMERCIAL

## 2020-09-28 VITALS
OXYGEN SATURATION: 99 % | SYSTOLIC BLOOD PRESSURE: 124 MMHG | WEIGHT: 260 LBS | HEIGHT: 68 IN | RESPIRATION RATE: 18 BRPM | DIASTOLIC BLOOD PRESSURE: 81 MMHG | BODY MASS INDEX: 39.4 KG/M2 | TEMPERATURE: 98.9 F | HEART RATE: 92 BPM

## 2020-09-28 LAB
BACTERIA: NEGATIVE /HPF
BILIRUBIN URINE: NEGATIVE
BLOOD, URINE: ABNORMAL
CLARITY: CLEAR
COLOR: YELLOW
EPITHELIAL CELLS, UA: ABNORMAL /HPF (ref 0–5)
GLUCOSE URINE: NEGATIVE MG/DL
HYALINE CASTS: ABNORMAL /HPF (ref 0–5)
KETONES, URINE: ABNORMAL MG/DL
LEUKOCYTE ESTERASE, URINE: NEGATIVE
NITRITE, URINE: NEGATIVE
PH UA: 5 (ref 5–9)
PROTEIN UA: NEGATIVE MG/DL
RBC UA: ABNORMAL /HPF (ref 0–5)
SPECIFIC GRAVITY UA: 1.02 (ref 1–1.03)
URINE REFLEX TO CULTURE: ABNORMAL
UROBILINOGEN, URINE: 0.2 E.U./DL
WBC UA: ABNORMAL /HPF (ref 0–5)

## 2020-09-28 PROCEDURE — 81001 URINALYSIS AUTO W/SCOPE: CPT

## 2020-09-28 PROCEDURE — 99282 EMERGENCY DEPT VISIT SF MDM: CPT

## 2020-09-28 PROCEDURE — 6360000002 HC RX W HCPCS: Performed by: NURSE PRACTITIONER

## 2020-09-28 PROCEDURE — 96372 THER/PROPH/DIAG INJ SC/IM: CPT

## 2020-09-28 RX ORDER — CYCLOBENZAPRINE HCL 10 MG
10 TABLET ORAL 3 TIMES DAILY PRN
Qty: 21 TABLET | Refills: 0 | Status: SHIPPED | OUTPATIENT
Start: 2020-09-28 | End: 2020-10-08

## 2020-09-28 RX ORDER — ORPHENADRINE CITRATE 30 MG/ML
60 INJECTION INTRAMUSCULAR; INTRAVENOUS ONCE
Status: COMPLETED | OUTPATIENT
Start: 2020-09-28 | End: 2020-09-28

## 2020-09-28 RX ORDER — KETOROLAC TROMETHAMINE 30 MG/ML
60 INJECTION, SOLUTION INTRAMUSCULAR; INTRAVENOUS ONCE
Status: COMPLETED | OUTPATIENT
Start: 2020-09-28 | End: 2020-09-28

## 2020-09-28 RX ORDER — KETOROLAC TROMETHAMINE 10 MG/1
10 TABLET, FILM COATED ORAL EVERY 6 HOURS PRN
Qty: 20 TABLET | Refills: 0 | OUTPATIENT
Start: 2020-09-28 | End: 2021-04-09

## 2020-09-28 RX ORDER — PREDNISONE 20 MG/1
40 TABLET ORAL DAILY
Qty: 20 TABLET | Refills: 0 | Status: SHIPPED | OUTPATIENT
Start: 2020-09-28 | End: 2020-10-08

## 2020-09-28 RX ADMIN — KETOROLAC TROMETHAMINE 60 MG: 30 INJECTION, SOLUTION INTRAMUSCULAR at 15:44

## 2020-09-28 RX ADMIN — ORPHENADRINE CITRATE 60 MG: 30 INJECTION INTRAMUSCULAR; INTRAVENOUS at 15:45

## 2020-09-28 ASSESSMENT — ENCOUNTER SYMPTOMS
BLOOD IN STOOL: 0
COLOR CHANGE: 0
TROUBLE SWALLOWING: 0
EYE PAIN: 0
RHINORRHEA: 0
WHEEZING: 0
VOMITING: 0
EYE DISCHARGE: 0
SHORTNESS OF BREATH: 0
CONSTIPATION: 0
NAUSEA: 0
SORE THROAT: 0
DIARRHEA: 0
EYE REDNESS: 0
ABDOMINAL PAIN: 0
COUGH: 0
BACK PAIN: 0

## 2020-09-28 ASSESSMENT — PAIN DESCRIPTION - DESCRIPTORS: DESCRIPTORS: ACHING;THROBBING

## 2020-09-28 ASSESSMENT — PAIN DESCRIPTION - ORIENTATION: ORIENTATION: RIGHT

## 2020-09-28 ASSESSMENT — PAIN DESCRIPTION - FREQUENCY: FREQUENCY: CONTINUOUS

## 2020-09-28 ASSESSMENT — PAIN SCALES - GENERAL
PAINLEVEL_OUTOF10: 8
PAINLEVEL_OUTOF10: 8

## 2020-09-28 ASSESSMENT — PAIN DESCRIPTION - PAIN TYPE: TYPE: ACUTE PAIN

## 2020-09-28 ASSESSMENT — PAIN DESCRIPTION - LOCATION: LOCATION: HIP

## 2020-09-28 NOTE — ED PROVIDER NOTES
[address] [facility] ED  EMERGENCY DEPARTMENT ENCOUNTER      Pt Name: Quirino Banegas  MRN: [id number]  [place] 1975  Date of evaluation: 9/28/2020  Provider: CHAPINCITO Franco CNP    CHIEF COMPLAINT       Chief Complaint   Patient presents with    Urinary Tract Infection     uti burning with uriantion x4 days also having right hip pain states pain shoots down right leg denies any injury         HISTORY OF PRESENT ILLNESS   (Location/Symptom, Timing/Onset,Context/Setting, Quality, Duration, Modifying Factors, Severity)  Note limiting factors. Quirino Banegas is a 38 y.o. female who presents to the emergency department with a chart with past medical history of diabetes for chief complaint of burning upon urination for the past 4 days. She denies noting any hematuria. She states that she feels that she was to empty her bladder but cannot. In addition she is also experiencing right hip pain that shoots down her right leg. She has no alleviating or modifying factors at this time. Denies numbness tingling, loss of bladder or bowel control. Nursing Notes were reviewed. REVIEW OF SYSTEMS    (2-9 systems for level 4, 10 or more for level 5)     Review of Systems   Constitutional: Negative for activity change, appetite change, fatigue and fever. HENT: Negative for congestion, ear pain, rhinorrhea, sore throat and trouble swallowing. Eyes: Negative for pain, discharge and redness. Respiratory: Negative for cough, shortness of breath and wheezing. Cardiovascular: Negative for chest pain and palpitations. Gastrointestinal: Negative for abdominal pain, blood in stool, constipation, diarrhea, nausea and vomiting. Endocrine: Negative for polydipsia and polyuria. Genitourinary: Positive for difficulty urinating and dysuria. Negative for decreased urine volume, flank pain and hematuria. Musculoskeletal: Positive for myalgias. Negative for arthralgias and back pain.    Skin: Negative for color change, rash and wound. Neurological: Negative for dizziness, syncope, weakness, light-headedness and headaches. Psychiatric/Behavioral: Negative for behavioral problems. All other systems reviewed and are negative. Except as noted above the remainder of the review of systems was reviewed and negative.        PAST MEDICAL HISTORY     Past Medical History:   Diagnosis Date    Diabetes mellitus ([facility] [address]      Past Surgical History:   Procedure Laterality Date    HERNIA REPAIR       Social History     Socioeconomic History    Marital status: Single     Spouse name: None    Number of children: None    Years of education: None    Highest education level: None   Occupational History    None   Social Needs    Financial resource strain: None    Food insecurity     Worry: None     Inability: None    Transportation needs     Medical: None     Non-medical: None   Tobacco Use    Smoking status: Current Every Day Smoker     Types: Cigarettes    Smokeless tobacco: Never Used   Substance and Sexual Activity    Alcohol use: No    Drug use: No    Sexual activity: None   Lifestyle    Physical activity     Days per week: None     Minutes per session: None    Stress: None   Relationships    Social connections     Talks on phone: None     Gets together: None     Attends Hinduism service: None     Active member of club or organization: None     Attends meetings of clubs or organizations: None     Relationship status: None    Intimate partner violence     Fear of current or ex partner: None     Emotionally abused: None     Physically abused: None     Forced sexual activity: None   Other Topics Concern    None   Social History Narrative    None       SCREENINGS             PHYSICAL EXAM    (up to 7 for level 4, 8 or more for level 5)     ED Triage Vitals [09/28/20 1441]   BP Temp Temp Source Pulse Resp SpO2 Height Weight   124/81 98.9 °F (37.2 °C) Oral 92 18 99 % 5' 8\" (1.727 m) 260 lb (117.9 kg)       Physical Exam  Vitals signs and nursing note reviewed. Constitutional:       General: She is not in acute distress. Appearance: She is well-developed. She is not diaphoretic. HENT:      Head: Normocephalic and atraumatic. Nose: Nose normal.   Eyes:      Conjunctiva/sclera: Conjunctivae normal.      Pupils: Pupils are equal, round, and reactive to light. Neck:      Musculoskeletal: Normal range of motion and neck supple. Cardiovascular:      Rate and Rhythm: Normal rate and regular rhythm. Heart sounds: Normal heart sounds. Pulmonary:      Effort: Pulmonary effort is normal. No respiratory distress. Breath sounds: Normal breath sounds. No wheezing. Abdominal:      General: Bowel sounds are normal.      Palpations: Abdomen is soft. Tenderness: There is no abdominal tenderness. Skin:     General: Skin is warm and dry. Capillary Refill: Capillary refill takes less than 2 seconds. Findings: No rash. Neurological:      Mental Status: She is alert and oriented to person, place, and time. Cranial Nerves: No cranial nerve deficit.    Psychiatric:         Behavior: Behavior normal.         RESULTS     EKG: All EKG's are interpreted by the Emergency Department Physician who either signs or Co-signsthis chart in the absence of a cardiologist.        RADIOLOGY:   Para [place] such as CT, Ultrasound and MRI are read by the radiologist. Plain radiographic images are visualized and preliminarily interpreted by the emergency physician with the below findings:        Interpretation per the Radiologist below, if available at the time ofthis note:    No orders to display         ED BEDSIDE ULTRASOUND:   Performed by ED Physician - none    LABS:  Labs Reviewed   URINE RT REFLEX TO CULTURE - Abnormal; Notable for the following components:       Result Value    Ketones, Urine TRACE (*)     Blood, Urine SMALL (*)     All other components within normal limits MICROSCOPIC URINALYSIS - Abnormal; Notable for the following components:    RBC, UA 6-10 (*)     All other components within normal limits       All other labs were within normal range or not returned as of this dictation. EMERGENCY DEPARTMENT COURSE and DIFFERENTIAL DIAGNOSIS/MDM:   Vitals:    Vitals:    09/28/20 1441   BP: 124/81   Pulse: 92   Resp: 18   Temp: 98.9 °F (37.2 °C)   TempSrc: Oral   SpO2: 99%   Weight: 260 lb (117.9 kg)   Height: 5' 8\" (1.727 m)            MDM   Patient is a 53-year-old female with a chief complaint of dysuria and right hip pain shooting down. No injury or trauma. Patient medicated with Norflex and Toradol. Urinalysis obtained. Upon reassessment patient reports moderate relief. Urinalysis is negative. She is diagnosed with right-sided sciatica. She is given a prescription for Flexeril, prednisone, and Toradol. She is instructed to follow-up with primary care doctor. Discharged in stable condition with stable vitals. CRITICAL CARE TIME       CONSULTS:  None    PROCEDURES:  Unless otherwise noted below, none     Procedures    FINAL IMPRESSION      1.  Sciatica of right side          DISPOSITION/PLAN   DISPOSITION Decision To Discharge 09/28/2020 04:06:46 PM      PATIENT REFERRED TO:  Shandra Hutton MD  [address] [place] [address]  [phone]    Schedule an appointment as soon as possible for a visit in 1 day        DISCHARGE MEDICATIONS:  New Prescriptions    CYCLOBENZAPRINE (FLEXERIL) 10 MG TABLET    Take 1 tablet by mouth 3 times daily as needed for Muscle spasms    KETOROLAC (TORADOL) 10 MG TABLET    Take 1 tablet by mouth every 6 hours as needed for Pain    PREDNISONE (DELTASONE) 20 MG TABLET    Take 2 tablets by mouth daily for 10 days          (Please notethat portions of this note were completed with a voice recognition program.  Efforts were made to edit the dictations but occasionally words are mis-transcribed.)    CHAPINCITO Bacon - CNP (electronically signed)  Attending Emergency Physician          Kenny Sanchez, CHAPINCITO - CNP  09/28/20 [id number]

## 2020-12-20 ENCOUNTER — HOSPITAL ENCOUNTER (EMERGENCY)
Age: 45
Discharge: HOME | End: 2020-12-20
Payer: COMMERCIAL

## 2020-12-20 VITALS
BODY MASS INDEX: 37.89 KG/M2 | HEIGHT: 68 IN | HEART RATE: 86 BPM | TEMPERATURE: 98.9 F | DIASTOLIC BLOOD PRESSURE: 67 MMHG | RESPIRATION RATE: 16 BRPM | SYSTOLIC BLOOD PRESSURE: 136 MMHG | OXYGEN SATURATION: 99 % | WEIGHT: 250 LBS

## 2020-12-20 LAB
BACTERIA: ABNORMAL /HPF
BILIRUBIN URINE: NEGATIVE
BLOOD, URINE: ABNORMAL
CLARITY: CLEAR
CLUE CELLS: ABNORMAL
COLOR: YELLOW
EPITHELIAL CELLS, UA: ABNORMAL /HPF (ref 0–5)
GLUCOSE URINE: NEGATIVE MG/DL
HYALINE CASTS: ABNORMAL /HPF (ref 0–5)
KETONES, URINE: ABNORMAL MG/DL
LEUKOCYTE ESTERASE, URINE: NEGATIVE
NITRITE, URINE: NEGATIVE
PH UA: 8.5 (ref 5–9)
PROTEIN UA: NEGATIVE MG/DL
RBC UA: ABNORMAL /HPF (ref 0–5)
SPECIFIC GRAVITY UA: 1.02 (ref 1–1.03)
TRICHOMONAS PREP: ABNORMAL
TRICHOMONAS VAGINALIS SCREEN: NEGATIVE
URINE REFLEX TO CULTURE: ABNORMAL
UROBILINOGEN, URINE: 1 E.U./DL
WBC UA: ABNORMAL /HPF (ref 0–5)
YEAST WET PREP: ABNORMAL

## 2020-12-20 PROCEDURE — 87808 TRICHOMONAS ASSAY W/OPTIC: CPT

## 2020-12-20 PROCEDURE — 81001 URINALYSIS AUTO W/SCOPE: CPT

## 2020-12-20 PROCEDURE — 87210 SMEAR WET MOUNT SALINE/INK: CPT

## 2020-12-20 PROCEDURE — 99283 EMERGENCY DEPT VISIT LOW MDM: CPT

## 2020-12-20 PROCEDURE — 6370000000 HC RX 637 (ALT 250 FOR IP): Performed by: PHYSICIAN ASSISTANT

## 2020-12-20 RX ORDER — METRONIDAZOLE 500 MG/1
500 TABLET ORAL 2 TIMES DAILY
Qty: 14 TABLET | Refills: 0 | Status: SHIPPED | OUTPATIENT
Start: 2020-12-20 | End: 2020-12-27

## 2020-12-20 RX ORDER — ETODOLAC 400 MG/1
400 TABLET, FILM COATED ORAL 2 TIMES DAILY
Qty: 14 TABLET | Refills: 0 | Status: SHIPPED | OUTPATIENT
Start: 2020-12-20 | End: 2021-03-03

## 2020-12-20 RX ORDER — METRONIDAZOLE 500 MG/1
500 TABLET ORAL ONCE
Status: COMPLETED | OUTPATIENT
Start: 2020-12-20 | End: 2020-12-20

## 2020-12-20 RX ORDER — NAPROXEN 500 MG/1
500 TABLET ORAL ONCE
Status: COMPLETED | OUTPATIENT
Start: 2020-12-20 | End: 2020-12-20

## 2020-12-20 RX ADMIN — NAPROXEN 500 MG: 500 TABLET ORAL at 16:19

## 2020-12-20 RX ADMIN — METRONIDAZOLE 500 MG: 500 TABLET ORAL at 16:19

## 2020-12-20 ASSESSMENT — PAIN SCALES - GENERAL
PAINLEVEL_OUTOF10: 7
PAINLEVEL_OUTOF10: 7

## 2020-12-20 ASSESSMENT — PAIN DESCRIPTION - PAIN TYPE: TYPE: ACUTE PAIN

## 2020-12-20 ASSESSMENT — ENCOUNTER SYMPTOMS
TROUBLE SWALLOWING: 0
VOMITING: 0
DIARRHEA: 0
ALLERGIC/IMMUNOLOGIC NEGATIVE: 1
EYE PAIN: 0
SHORTNESS OF BREATH: 0
COLOR CHANGE: 0
APNEA: 0
NAUSEA: 0

## 2020-12-20 ASSESSMENT — PAIN DESCRIPTION - LOCATION: LOCATION: ABDOMEN

## 2020-12-20 ASSESSMENT — PAIN DESCRIPTION - DESCRIPTORS: DESCRIPTORS: ACHING

## 2020-12-20 ASSESSMENT — PAIN DESCRIPTION - FREQUENCY: FREQUENCY: CONTINUOUS

## 2020-12-20 NOTE — ED PROVIDER NOTES
[address] [facility] ED  eMERGENCYdEPARTMENT eNCOUnter      Pt Name: Sis Marinelli  MRN: [id number]  [place] 1975  Date of evaluation: 12/20/2020  Provider:Lele Howard PA-C    CHIEF COMPLAINT       Chief Complaint   Patient presents with    Abdominal Pain     pt c/o ABD pain, dysuria, and vaginal itching since yesterday         HISTORY OF PRESENT ILLNESS  (Location/Symptom, Timing/Onset, Context/Setting, Quality, Duration, Modifying Factors, Severity.)   Sis Marinelli is a 54 y.o. female who presents to the emergency department with complaints of suprapubic abdominal pain, dysuria, vaginal itching and discharge that began yesterday. Patient denies any nausea, vomiting or diarrhea. Patient denies any fevers. Patient denies any hematuria. No flank pain. Patient states that the pain increases when she bends over. She describes the discharge as a mucousy type of white discharge with mild itching. Patient states her last sexual activity was approximately 3 years ago and no chance of recent STDs    HPI    Nursing Notes were reviewed and I agree. REVIEW OF SYSTEMS    (2-9 systems for level 4, 10 or more for level 5)     Review of Systems   Constitutional: Negative for diaphoresis and fever. HENT: Negative for hearing loss and trouble swallowing. Eyes: Negative for pain. Respiratory: Negative for apnea and shortness of breath. Cardiovascular: Negative for chest pain. Gastrointestinal: Negative for diarrhea, nausea and vomiting. Endocrine: Negative. Genitourinary: Positive for dysuria, pelvic pain and vaginal discharge. Negative for hematuria. Musculoskeletal: Negative for neck pain and neck stiffness. Skin: Negative for color change. Allergic/Immunologic: Negative. Neurological: Negative for dizziness and numbness. Hematological: Negative. Psychiatric/Behavioral: Negative. All other systems reviewed and are negative.        Except as noted above the remainder of the review of systems was reviewed and negative. PAST MEDICAL HISTORY     Past Medical History:   Diagnosis Date    Diabetes mellitus ([facility] [address]          SURGICAL HISTORY       Past Surgical History:   Procedure Laterality Date    HERNIA REPAIR           CURRENT MEDICATIONS       Previous Medications    KETOROLAC (TORADOL) 10 MG TABLET    Take 1 tablet by mouth every 6 hours as needed for Pain    NAPROXEN (NAPROSYN) 500 MG TABLET    Take 1 tablet by mouth 2 times daily as needed for Pain       ALLERGIES     Shrimp (diagnostic)    FAMILY HISTORY     History reviewed. No pertinent family history.        SOCIAL HISTORY       Social History     Socioeconomic History    Marital status: Single     Spouse name: None    Number of children: None    Years of education: None    Highest education level: None   Occupational History    None   Social Needs    Financial resource strain: None    Food insecurity     Worry: None     Inability: None    Transportation needs     Medical: None     Non-medical: None   Tobacco Use    Smoking status: Current Every Day Smoker     Types: Cigarettes    Smokeless tobacco: Never Used   Substance and Sexual Activity    Alcohol use: No    Drug use: No    Sexual activity: None   Lifestyle    Physical activity     Days per week: None     Minutes per session: None    Stress: None   Relationships    Social connections     Talks on phone: None     Gets together: None     Attends Congregational service: None     Active member of club or organization: None     Attends meetings of clubs or organizations: None     Relationship status: None    Intimate partner violence     Fear of current or ex partner: None     Emotionally abused: None     Physically abused: None     Forced sexual activity: None   Other Topics Concern    None   Social History Narrative    None       SCREENINGS           PHYSICAL EXAM    (up to 7 forlevel 4, 8 or more for level 5)     ED Triage Vitals [12/20/20 1454]   BP Temp Temp Source Pulse Resp SpO2 Height Weight   136/67 98.9 °F (37.2 °C) Oral 86 16 99 % 5' 8\" (1.727 m) 250 lb (113.4 kg)       Physical Exam  Vitals signs and nursing note reviewed. Constitutional:       General: She is not in acute distress. Appearance: She is well-developed. She is not diaphoretic. HENT:      Head: Normocephalic and atraumatic. Mouth/Throat:      Pharynx: No oropharyngeal exudate. Eyes:      General: No scleral icterus. Conjunctiva/sclera: Conjunctivae normal.      Pupils: Pupils are equal, round, and reactive to light. Neck:      Musculoskeletal: Normal range of motion and neck supple. Trachea: No tracheal deviation. Cardiovascular:      Rate and Rhythm: Normal rate. Heart sounds: Normal heart sounds. Pulmonary:      Effort: Pulmonary effort is normal. No respiratory distress. Breath sounds: Normal breath sounds. Abdominal:      General: Bowel sounds are normal. There is no distension. Palpations: Abdomen is soft. Tenderness: There is abdominal tenderness in the suprapubic area. Musculoskeletal: Normal range of motion. Skin:     General: Skin is warm and dry. Findings: No erythema or rash. Neurological:      Mental Status: She is alert and oriented to person, place, and time. Cranial Nerves: No cranial nerve deficit. Motor: No abnormal muscle tone. Psychiatric:         Behavior: Behavior normal.         Thought Content:  Thought content normal.         Judgment: Judgment normal.           DIAGNOSTIC RESULTS     RADIOLOGY:   Non-plain film images such as CT, Ultrasound and MRI are read by the radiologist. Plain radiographic images are visualized and preliminarilyinterpreted by Moises March PA-C with the below findings:      Interpretation per the Radiologist below, if available at the time of this note:    No orders to display       LABS:  Labs Reviewed   WET PREP, GENITAL - Abnormal; Notable for the following components:       Result Value    Clue Cells, Wet Prep <1+ (*)     All other components within normal limits   URINE RT REFLEX TO CULTURE - Abnormal; Notable for the following components:    Ketones, Urine TRACE (*)     Blood, Urine TRACE (*)     All other components within normal limits   TRICHOMONAS BY EIA   MICROSCOPIC URINALYSIS       All other labs were within normal range or not returnedas of this dictation. EMERGENCYDEPARTMENT COURSE and DIFFERENTIAL DIAGNOSIS/MDM:   Vitals:    Vitals:    12/20/20 1454   BP: 136/67   Pulse: 86   Resp: 16   Temp: 98.9 °F (37.2 °C)   TempSrc: Oral   SpO2: 99%   Weight: 250 lb (113.4 kg)   Height: 5' 8\" (1.727 m)       REASSESSMENT        Patient presented the emergency department with discharge and pelvic pain.   1+ clue cells on exam.  Patient will be treated for BV    MDM    PROCEDURES:    Procedures      FINAL IMPRESSION      1. BV (bacterial vaginosis)          DISPOSITION/PLAN   DISPOSITION Decision To Discharge 12/20/2020 04:07:47 PM      PATIENT REFERRED TO:  [facility]  [address] [facility]  [phone]  Call in 2 days        DISCHARGE MEDICATIONS:  New Prescriptions    ETODOLAC (LODINE) 400 MG TABLET    Take 1 tablet by mouth 2 times daily    METRONIDAZOLE (FLAGYL) 500 MG TABLET    Take 1 tablet by mouth 2 times daily for 7 days       (Please note that portions of this note were completed with a voice recognition program.  Efforts were made to edit the dictations but occasionally words are mis-transcribed.)    FAMILIA Hawkins PA-C  12/20/20 [id number]

## 2020-12-20 NOTE — CARE COORDINATION
Pt given information on the [facility] pcp's in this area and encouraged to choose one and make an appt as soon as possible. She states understanding.

## 2021-03-03 ENCOUNTER — APPOINTMENT (OUTPATIENT)
Dept: GENERAL RADIOLOGY | Age: 46
End: 2021-03-03
Payer: COMMERCIAL

## 2021-03-03 ENCOUNTER — HOSPITAL ENCOUNTER (EMERGENCY)
Age: 46
Discharge: HOME | End: 2021-03-03
Payer: COMMERCIAL

## 2021-03-03 VITALS
WEIGHT: 250 LBS | BODY MASS INDEX: 37.89 KG/M2 | HEIGHT: 68 IN | TEMPERATURE: 98.4 F | DIASTOLIC BLOOD PRESSURE: 68 MMHG | OXYGEN SATURATION: 99 % | HEART RATE: 74 BPM | SYSTOLIC BLOOD PRESSURE: 122 MMHG | RESPIRATION RATE: 20 BRPM

## 2021-03-03 DIAGNOSIS — S93.402A SPRAIN OF LEFT ANKLE, UNSPECIFIED LIGAMENT, INITIAL ENCOUNTER: ICD-10-CM

## 2021-03-03 DIAGNOSIS — S93.401A SPRAIN OF RIGHT ANKLE, UNSPECIFIED LIGAMENT, INITIAL ENCOUNTER: ICD-10-CM

## 2021-03-03 DIAGNOSIS — S39.012A STRAIN OF LUMBAR REGION, INITIAL ENCOUNTER: Primary | ICD-10-CM

## 2021-03-03 PROCEDURE — 99283 EMERGENCY DEPT VISIT LOW MDM: CPT

## 2021-03-03 PROCEDURE — 73610 X-RAY EXAM OF ANKLE: CPT

## 2021-03-03 PROCEDURE — 72110 X-RAY EXAM L-2 SPINE 4/>VWS: CPT

## 2021-03-03 PROCEDURE — 6360000002 HC RX W HCPCS: Performed by: PHYSICIAN ASSISTANT

## 2021-03-03 PROCEDURE — 96372 THER/PROPH/DIAG INJ SC/IM: CPT

## 2021-03-03 RX ORDER — CYCLOBENZAPRINE HCL 10 MG
10 TABLET ORAL 3 TIMES DAILY PRN
Qty: 15 TABLET | Refills: 0 | Status: SHIPPED | OUTPATIENT
Start: 2021-03-03 | End: 2021-03-13

## 2021-03-03 RX ORDER — ETODOLAC 400 MG/1
400 TABLET, FILM COATED ORAL 2 TIMES DAILY
Qty: 14 TABLET | Refills: 0 | OUTPATIENT
Start: 2021-03-03 | End: 2021-04-09

## 2021-03-03 RX ORDER — KETOROLAC TROMETHAMINE 30 MG/ML
60 INJECTION, SOLUTION INTRAMUSCULAR; INTRAVENOUS ONCE
Status: COMPLETED | OUTPATIENT
Start: 2021-03-03 | End: 2021-03-03

## 2021-03-03 RX ADMIN — KETOROLAC TROMETHAMINE 60 MG: 30 INJECTION, SOLUTION INTRAMUSCULAR at 21:30

## 2021-03-03 ASSESSMENT — PAIN DESCRIPTION - ORIENTATION
ORIENTATION: RIGHT;LEFT
ORIENTATION: RIGHT;LEFT

## 2021-03-03 ASSESSMENT — ENCOUNTER SYMPTOMS
ABDOMINAL PAIN: 0
APNEA: 0
TROUBLE SWALLOWING: 0
COLOR CHANGE: 0
SHORTNESS OF BREATH: 0
ALLERGIC/IMMUNOLOGIC NEGATIVE: 1
EYE PAIN: 0
BACK PAIN: 1

## 2021-03-03 ASSESSMENT — PAIN DESCRIPTION - LOCATION: LOCATION: ANKLE;BACK

## 2021-03-03 ASSESSMENT — PAIN DESCRIPTION - PAIN TYPE: TYPE: ACUTE PAIN

## 2021-03-03 ASSESSMENT — PAIN DESCRIPTION - DESCRIPTORS: DESCRIPTORS: ACHING

## 2021-03-03 ASSESSMENT — PAIN DESCRIPTION - PROGRESSION: CLINICAL_PROGRESSION: GRADUALLY WORSENING

## 2021-03-04 NOTE — ED PROVIDER NOTES
[address] [facility] ED  eMERGENCYdEPARTMENT eNCOUnter      Pt Name: Shawn Rene  MRN: [id number]  [place] 1975  Date of evaluation: 3/3/2021  Provider:Lele Serrano PA-C    CHIEF COMPLAINT       Chief Complaint   Patient presents with    Fall     fell down 2 stairs on monday. bilateral ankle swelling. back pain. HISTORY OF PRESENT ILLNESS  (Location/Symptom, Timing/Onset, Context/Setting, Quality, Duration, Modifying Factors, Severity.)   Shawn Rene is a 54 y.o. female who presents to the emergency department with complaints of bilateral ankle pain and lower back pain following a fall down 3 stairs on Monday. Patient states that the fall was mechanical in nature. Patient denies hitting her head or any loss of consciousness. Patient complains of diffuse lower back pain but denies any saddle paresthesias or loss of bowel or bladder control. Patient also states that she is having bilateral ankle pain in the lateral malleoli regions. Patient has been taking ibuprofen for the pain    HPI    Nursing Notes were reviewed and I agree. REVIEW OF SYSTEMS    (2-9 systems for level 4, 10 or more for level 5)     Review of Systems   Constitutional: Negative for diaphoresis and fever. HENT: Negative for hearing loss and trouble swallowing. Eyes: Negative for pain. Respiratory: Negative for apnea and shortness of breath. Cardiovascular: Negative for chest pain. Gastrointestinal: Negative for abdominal pain. Endocrine: Negative. Genitourinary: Negative for hematuria. Musculoskeletal: Positive for arthralgias, back pain and joint swelling. Negative for neck pain and neck stiffness. Skin: Negative for color change. Allergic/Immunologic: Negative. Neurological: Negative for dizziness and numbness. Hematological: Negative. Psychiatric/Behavioral: Negative. All other systems reviewed and are negative.        Except as noted above the remainder of the review of systems was reviewed and negative. PAST MEDICAL HISTORY     Past Medical History:   Diagnosis Date    Diabetes mellitus ([address]          SURGICAL HISTORY       Past Surgical History:   Procedure Laterality Date    HERNIA REPAIR           CURRENT MEDICATIONS       Previous Medications    KETOROLAC (TORADOL) 10 MG TABLET    Take 1 tablet by mouth every 6 hours as needed for Pain    NAPROXEN (NAPROSYN) 500 MG TABLET    Take 1 tablet by mouth 2 times daily as needed for Pain       ALLERGIES     Shrimp (diagnostic)    FAMILY HISTORY     History reviewed. No pertinent family history.        SOCIAL HISTORY       Social History     Socioeconomic History    Marital status: Single     Spouse name: None    Number of children: None    Years of education: None    Highest education level: None   Occupational History    None   Social Needs    Financial resource strain: None    Food insecurity     Worry: None     Inability: None    Transportation needs     Medical: None     Non-medical: None   Tobacco Use    Smoking status: Current Every Day Smoker     Types: Cigarettes    Smokeless tobacco: Never Used   Substance and Sexual Activity    Alcohol use: No    Drug use: No    Sexual activity: None   Lifestyle    Physical activity     Days per week: None     Minutes per session: None    Stress: None   Relationships    Social connections     Talks on phone: None     Gets together: None     Attends Synagogue service: None     Active member of club or organization: None     Attends meetings of clubs or organizations: None     Relationship status: None    Intimate partner violence     Fear of current or ex partner: None     Emotionally abused: None     Physically abused: None     Forced sexual activity: None   Other Topics Concern    None   Social History Narrative    None       SCREENINGS           PHYSICAL EXAM    (up to 7 forlevel 4, 8 or more for level 5)     ED Triage Vitals [03/03/21 2050]   BP Temp Temp Source Pulse Resp SpO2 Height Weight   122/68 98.4 °F (36.9 °C) Oral 74 20 99 % 5' 8\" (1.727 m) 250 lb (113.4 kg)       Physical Exam  Vitals signs and nursing note reviewed. Constitutional:       General: She is not in acute distress. Appearance: She is well-developed. She is not diaphoretic. HENT:      Head: Normocephalic and atraumatic. Mouth/Throat:      Pharynx: No oropharyngeal exudate. Eyes:      General: No scleral icterus. Conjunctiva/sclera: Conjunctivae normal.      Pupils: Pupils are equal, round, and reactive to light. Neck:      Musculoskeletal: Normal range of motion and neck supple. Trachea: No tracheal deviation. Cardiovascular:      Rate and Rhythm: Normal rate. Heart sounds: Normal heart sounds. Pulmonary:      Effort: Pulmonary effort is normal. No respiratory distress. Breath sounds: Normal breath sounds. Abdominal:      General: Bowel sounds are normal. There is no distension. Palpations: Abdomen is soft. Musculoskeletal: Normal range of motion. Right ankle: She exhibits swelling. Tenderness. Lateral malleolus tenderness found. Left ankle: Tenderness. Lateral malleolus tenderness found. Lumbar back: She exhibits tenderness, pain and spasm. Back:    Skin:     General: Skin is warm and dry. Findings: No erythema or rash. Neurological:      Mental Status: She is alert and oriented to person, place, and time. Cranial Nerves: No cranial nerve deficit. Motor: No abnormal muscle tone. Psychiatric:         Behavior: Behavior normal.         Thought Content:  Thought content normal.         Judgment: Judgment normal.           DIAGNOSTIC RESULTS     RADIOLOGY:   Non-plain film images such as CT, Ultrasound and MRI are read by the radiologist. Plain radiographic images are visualized and preliminarilyinterpreted by Melanie Jimenez PA-C with the below findings:    No FX  Interpretation per the Radiologist below, if available at the time of this note:    XR LUMBAR SPINE (MIN 4 VIEWS)    (Results Pending)   XR ANKLE LEFT (MIN 3 VIEWS)    (Results Pending)   XR ANKLE RIGHT (MIN 3 VIEWS)    (Results Pending)       LABS:  Labs Reviewed - No data to display    All other labs were within normal range or not returnedas of this dictation. EMERGENCYDEPARTMENT COURSE and DIFFERENTIAL DIAGNOSIS/MDM:   Vitals:    Vitals:    03/03/21 2050   BP: 122/68   Pulse: 74   Resp: 20   Temp: 98.4 °F (36.9 °C)   TempSrc: Oral   SpO2: 99%   Weight: 250 lb (113.4 kg)   Height: 5' 8\" (1.727 m)       REASSESSMENT      Patient presented the emergency department with bilateral ankle pain and lower back pain following a fall on Monday. X-rays are unremarkable. Patient had no saddle paresthesias or loss of bowel or bladder control to suggest spinal cord related injury. Patient will be discharged home with ankle braces, supportive therapy including anti-inflammatories and muscle relaxants and PCP follow-up    MDM    PROCEDURES:    Procedures      FINAL IMPRESSION      1. Strain of lumbar region, initial encounter    2. Sprain of left ankle, unspecified ligament, initial encounter    3.  Sprain of right ankle, unspecified ligament, initial encounter          DISPOSITION/PLAN   DISPOSITION        PATIENT REFERRED TO:  [facility]  [address] [place] [address]  [phone]  Call in 2 days        DISCHARGE MEDICATIONS:  New Prescriptions    CYCLOBENZAPRINE (FLEXERIL) 10 MG TABLET    Take 1 tablet by mouth 3 times daily as needed for Muscle spasms    ETODOLAC (LODINE) 400 MG TABLET    Take 1 tablet by mouth 2 times daily       (Please note that portions of this note were completed with a voice recognition program.  Efforts were made to edit the dictations but occasionally words are mis-transcribed.)    Kathe Lesch, PA-C Kathe Lesch, PA-C  03/03/21 6018

## 2021-03-18 ENCOUNTER — HOSPITAL ENCOUNTER (OUTPATIENT)
Dept: PHYSICAL THERAPY | Age: 46
Setting detail: THERAPIES SERIES
Discharge: HOME | End: 2021-03-18
Payer: COMMERCIAL

## 2021-03-18 PROCEDURE — 97110 THERAPEUTIC EXERCISES: CPT

## 2021-03-18 PROCEDURE — 97162 PT EVAL MOD COMPLEX 30 MIN: CPT

## 2021-03-18 ASSESSMENT — PAIN DESCRIPTION - ORIENTATION: ORIENTATION: RIGHT;LOWER

## 2021-03-18 ASSESSMENT — PAIN DESCRIPTION - DESCRIPTORS: DESCRIPTORS: ACHING;PINS AND NEEDLES

## 2021-03-18 ASSESSMENT — PAIN DESCRIPTION - PAIN TYPE: TYPE: ACUTE PAIN

## 2021-03-18 ASSESSMENT — PAIN DESCRIPTION - DIRECTION: RADIATING_TOWARDS: R LEG

## 2021-03-18 NOTE — PROGRESS NOTES
[address]  PHYSICAL THERAPY EVALUATION    Date: 3/18/2021  Patient Name: Simi Rosales       MRN: [id number]   Account: [de-identified]   : 1975  (54 y.o.)   Gender: female   Referring Practitioner: Amy Oquendo DO                 Diagnosis: lumbago with sciatica, R side  Treatment Diagnosis: low back pain with R LE radiating symptoms, impaired lumbar and R hip AROM, impaired core and R hip strength, impaired function           Past Medical History:  has a past medical history of Diabetes mellitus ([address] Past Surgical History:   has a past surgical history that includes hernia repair. Vital Signs  Patient Currently in Pain: Yes   Pain Screening  Patient Currently in Pain: Yes  Pain Assessment  Pain Assessment: 0-10  Pain Level: 10(10/10 worst)  Pain Type: Acute pain  Pain Location: Back  Pain Orientation: Right; Lower  Pain Radiating Towards: R leg  Pain Descriptors: Aching;Pins and needles                Lives With: Daughter  Type of Home: Apartment  Home Layout: Two level  ADL Assistance: Independent  Homemaking Assistance: Independent  Homemaking Responsibilities: Yes  Ambulation Assistance: Independent  Transfer Assistance: Independent  Active : Yes        Subjective:  Subjective: Pt reports that she had sudden onset of low back pain after fall, last monday. Pt states pain in low back in R buttock, hip down into leg. Pain ins relieved by resting, heat, changing positions. Comments: RTD 3/25/21    Objective:      Balance  Posture: Good  Sitting - Static: Good  Sitting - Dynamic: Good  Standing - Static: Good  Standing - Dynamic: Good    Bed Mobility  Bridging: Independent    Ambulation 1  Surface: carpet  Device: No Device  Assistance: Independent        Transfers  Sit to Stand: Independent  Stand to sit:  Independent    Strength RLE  Strength RLE: Exception  R Hip Flexion: 3+/5  R Hip Extension: 3/5  R Hip ABduction: 3+/5  R Hip Internal Rotation: 3+/5  R Hip External Rotation: 4-/5  R Knee Flexion: 4-/5  R Knee Extension: 4/5  Strength LLE  Comment: grossly 4+/5 except hip abd 4/5        Strength Other  Other: abdominals 1/5 sahrmann     AROM RLE (degrees)  RLE General AROM: hip IR 0-9, hip ER 0-20     AROM LLE (degrees)  LLE General AROM: hip IR 20, hip ER 29              Spine  Lumbar: flex 25%, R SB 25%, L SB 50% ext WNL  Special Tests: SLR unable to perform accurately d/t elevated pain level throughout motion; SANTHOSH unable to obtain position d/t pain, FADIR (-)    Observation/Palpation  Posture: Good  Observation: pleasant, cooperative, forward head, rounded shoulders, protracted scapula, increased upper T kyphosis  Bed mobility  Bridging: Independent  Rolling to Left: Independent  Rolling to Right: Independent  Supine to Sit: Independent  Sit to Supine: Independent        Additional Measures  Other: Tender to palpation: R>L lumbar paraspinals     Exercises:   Exercises  Exercise 1: prone, MIGUEL ANGEL, prone knee flex trialed with no change in peripheral symptoms  Exercise 2: LTR 5\"x10  Exercise 3: s/l thoracic rotation 5-8\"x10  Exercise 4: SKTC vs seated lumbar flexion stretch*  Exercise 5: gentle piriformis stretch*  Exercise 6: TA with breath*  Exercise 7: DLS*  Exercise 20: HEP: LTR, s/l Thoracic rot  Modalities:  Modalities  Moist heat: x10 to low back to decrease pain and mm spasms     Manual:  Manual therapy  Soft Tissue Mobalization: very tender to lumbar paraspinals R>L, deferred this date*     *Indicates exercise,modality, or manual techniques to be initiated when appropriate    Assessment: Body structures, Functions, Activity limitations: Decreased functional mobility , Decreased ROM, Decreased strength, Increased pain  Assessment: Pt presents with increased low back pain with R LE radiating symptoms, impaired lumbar and R hip AROM, impaired core and R hip strength, impaired function.  Pt would benefit from skilled physical therapy to address above impairments, activity limitation and participation restrictions to improve overall QOL. Prognosis: Good  Discharge Recommendations: Continue to assess pending progress  Activity Tolerance: Patient Tolerated treatment well     Decision Making: Medium Complexity  History: med  Exam: med  Clinical Presentation: med        Plan  Frequency/Duration:  Plan  Times per week: 2  Plan weeks: 4-6  Current Treatment Recommendations: Strengthening, Transfer Training, Endurance Training, Neuromuscular Re-education, Patient/Caregiver Education & Training, Equipment Evaluation, Education, & procurement, Manual Therapy - Soft Tissue Mobilization, ROM, Balance Training, Gait Training, Home Exercise Program, Modalities, Positioning, Safety Education & Training, Stair training, Aquatics, Integrated [employer], Functional Mobility Training, Manual Therapy - Joint Manipulation  Plan Comment: Transfer PT POC to Deon Forrest, PT     Patient Education  New Education Provided: PT Education: Goals;PT Role;Plan of Care    POST-PAIN     Pain Rating (0-10 pain scale):  8 /10  Location and pain description same as pre-treatment unless indicated. Action: [] NA  [] Call Physician  [x] Perform HEP  [] Meds as prescribed    Evaluation and patient rights have been reviewed and patient agrees with plan of care. Yes  [x]  No  []   Explain:     Johny Fall Risk Assessment  Risk Factor Scale  Score   History of Falls [] Yes  [x] No 25  0 25   Secondary Diagnosis [] Yes  [x] No 15  0 0   Ambulatory Aid [] Furniture  [] Crutches/cane/walker  [x] None/bedrest/wheelchair/nurse 30  15  0 0   IV/Heparin Lock [] Yes  [x] No 20  0 0   Gait/Transferring [] Impaired  [] Weak  [x] Normal/bedrest/immobile 20  10  0 0   Mental Status [] Forgets limitations  [x] Oriented to own ability 15  0 0      Total:25     Based on the Assessment score: check the appropriate box.   []  No intervention needed   Low =   Score of 0-24  [x]  Use standard prevention interventions Moderate =  Score of 24-44   [] Discuss fall prevention strategies   [] Indicate moderate falls risk on eval  []  Use high risk prevention interventions High = Score of 45 and higher   [] Discuss fall prevention strategies   [] Provide supervision during treatment time    Goals  Long term goals  Long term goal 1: Pt will demonstrate improved pain level to </=  2/10 to improve QOL. Long term goal 2: Pt will demonstrate improved lumbar and hip AROM to [facility] to allow for improved ability to perform daily tasks with decreased pain. Long term goal 3: Pt will demonstrate improve R hip and core strength to >/= 4+/5 and 3/5 respectively to allow for improved ability to perform daily activities with decreased pain. Long term goal 4: Pt will be indep with HEP to manage symptoms. Long term goal 5: Pt will demonstrate improved RICHARD (English) <8/50 to demonstrate improved function.                Procedure Minutes:     Timed Activity Minutes Units   Ther Ex     Manual          Electronically signed by Christiano Demarco PT on 3/18/21 at 8:02 AM EDT

## 2021-03-18 NOTE — PROGRESS NOTES
[address]     Ph: [phone]  Fax: [phone]    [] Certification  [] Recertification [x]  Plan of Care  [] Progress Note [] Discharge      To:  Amy Oquendo DO      From:  Juventino Huynh, PT  Patient: Simi Rosales     : 1975  Diagnosis: lumbago with sciatica, R side     Date: 3/18/2021  Treatment Diagnosis: low back pain with R LE radiating symptoms, impaired lumbar and R hip AROM, impaired core and R hip strength, impaired function     Progress Report Period from:  3/18/2021  to 3/18/2021    Total # of Visits to Date: 1   No Show: 0    Canceled Appointment: 0   Long Term Goals -  4-6  Goals Current/ Discharge status Met   Long term goal 1: Pt will demonstrate improved pain level to </=  2/10 to improve QOL. 10/10 [] yes  [] no   Long term goal 2: Pt will demonstrate improved lumbar and hip AROM to [facility] to allow for improved ability to perform daily tasks with decreased pain. AROM RLE (degrees)  RLE General AROM: hip IR 0-9, hip ER 0-20     AROM LLE (degrees)  LLE General AROM: hip IR 20, hip ER 29           Spine  Lumbar: flex 25%, R SB 25%, L SB 50% ext WNL  Special Tests: SLR unable to perform accurately d/t elevated pain level throughout motion; SANTHOSH unable to obtain position d/t pain, FADIR (-)        [] yes  [] no   Long term goal 3: Pt will demonstrate improve R hip and core strength to >/= 4+/5 and 3/5 respectively to allow for improved ability to perform daily activities with decreased pain.  Strength RLE  Strength RLE: Exception  R Hip Flexion: 3+/5  R Hip Extension: 3/5  R Hip ABduction: 3+/5  R Hip Internal Rotation: 3+/5  R Hip External Rotation: 4-/5  R Knee Flexion: 4-/5  R Knee Extension: 4/5  Strength LLE  Comment: grossly 4+/5 except hip abd 4/5        Strength Other  Other: abdominals 1/5 jeanettermann   [] yes  [] no   Long term goal 4: Pt will be indep with HEP to manage symptoms. To be established [] yes  [] no   Long term goal 5: Pt will demonstrate improved RICHARD (Sammarinese) <8/50 to demonstrate improved function. 17/50 [] yes  [] no        Body structures, Functions, Activity limitations: Decreased functional mobility , Decreased ROM, Decreased strength, Increased pain  Assessment: Pt presents with increased low back pain with R LE radiating symptoms, impaired lumbar and R hip AROM, impaired core and R hip strength, impaired function. Pt would benefit from skilled physical therapy to address above impairments, activity limitation and participation restrictions to improve overall QOL. Prognosis: Good  Discharge Recommendations: Continue to assess pending progress      PT Education: Goals;PT Role;Plan of Care    PLAN: [x] Evaluate and Treat  Frequency/Duration:  Plan  Times per week: 2  Plan weeks: 4-6  Current Treatment Recommendations: Strengthening, Transfer Training, Endurance Training, Neuromuscular Re-education, Patient/Caregiver Education & Training, Equipment Evaluation, Education, & procurement, Manual Therapy - Soft Tissue Mobilization, ROM, Balance Training, Gait Training, Home Exercise Program, Modalities, Positioning, Safety Education & Training, Stair training, Aquatics, Integrated [facility], Functional Mobility Training, Manual Therapy - Joint Manipulation  Plan Comment: Transfer PT POC to [employer], PT     Precautions:      none                  Patient Status:[x] Continue/ Initiate plan of Care    [] Discharge PT. Recommend pt continue with HEP. [] Additional visits requested, Please re-certify for additional visits:        Signature: Electronically signed by Vu Escobedo PT on 3/18/21 at 9:01 AM EDT      If you have any questions or concerns, please don't hesitate to call. Thank you for your referral.    I have reviewed this plan of care and certify a need for medically necessary rehabilitation services.     Physician Signature:__________________________________________________________  Date:  Please sign and return

## 2021-03-22 ENCOUNTER — HOSPITAL ENCOUNTER (OUTPATIENT)
Dept: PHYSICAL THERAPY | Age: 46
Setting detail: THERAPIES SERIES
Discharge: HOME | End: 2021-03-22
Payer: COMMERCIAL

## 2021-03-22 PROCEDURE — 97110 THERAPEUTIC EXERCISES: CPT

## 2021-03-22 ASSESSMENT — PAIN DESCRIPTION - DIRECTION: RADIATING_TOWARDS: R LEG

## 2021-03-22 ASSESSMENT — PAIN DESCRIPTION - ORIENTATION: ORIENTATION: RIGHT;LOWER

## 2021-03-22 NOTE — PROGRESS NOTES
[address] [address]  Outpatient Physical Therapy    Treatment Note        Date: 3/22/2021  Patient: Stacia Samuel  : 1975  ACCT #: [de-identified]  Referring Practitioner: Cindy Caceres DO  Diagnosis: lumbago with sciatica, R side    Visit Information:  PT Visit Information  Onset Date: 21  PT Insurance Information: [employer]  Total # of Visits Approved: 30  Total # of Visits to Date: 2  No Show: 0  Canceled Appointment: 0  Progress Note Counter:     Subjective: Pt reports she has been doing HEP conts to have pain into R LB and R LE. \"I always have pain. \" Pt arrived late for appt. Comments: RTD 3/25/21  HEP Compliance:  [x] Good [] Fair [] Poor [] Reports not doing due to:    Vital Signs  Patient Currently in Pain: Yes   Pain Screening  Patient Currently in Pain: Yes  Pain Assessment  Pain Assessment: 0-10  Pain Level: 9(8-9/10)  Pain Location: Back;Hip;Leg  Pain Orientation: Right; Lower  Pain Radiating Towards: R leg  Pain Descriptors: Aching    OBJECTIVE:   Exercises  Exercise 1: SKTC 10\"x3 with towel  Exercise 2: LTR 5\"x10  Exercise 4: SKTC vs seated lumbar flexion stretch*  Exercise 5: gentle piriformis stretch 30\"x3 with towel (H/L opp knee to chest) and seated figure 4 30\"x3  Exercise 6: TA chai 5\"x10  Exercise 7: DLS UE, LE O81 ea. Exercise 8: H/L hip abd/add 5\"x10 ball/RTB  Exercise 9: HS stretch 30\"x3 seated with box  Exercise 20: HEP: cont current    Strength: [x] NT  [] MMT completed:    ROM: [x] NT  [] ROM measurements:     Manual:   Manual therapy  Manual traction: attempted Hugo and R LE pulls with increased pain, deferred this date  Soft Tissue Mobalization: continued tenderness/hypersensitivity to lumbar paraspinals R>L, deferred this date    Modalities:  Modalities  Moist heat: x10 to low back to decrease pain and mm spasms     *Indicates exercise, modality, or manual techniques to be initiated when appropriate    Assessment:    Body structures, Functions, Activity limitations: Decreased functional mobility , Decreased ROM, Decreased strength, Increased pain  Assessment: Pt conts to be hypersensitive to palpation of R>L lumbar parapsinals as well as demos increased pain with attempted lumbar distraction. Initiated gentl ther ex for increased lumbar mobility and core stability with fair tolerance. Pt with pain level rating inconsistent with actions though does demo guarded bed mobility. Treatment Diagnosis: low back pain with R LE radiating symptoms, impaired lumbar and R hip AROM, impaired core and R hip strength, impaired function  Prognosis: Good     Goals:  Long term goals  Long term goal 1: Pt will demonstrate improved pain level to </=  2/10 to improve QOL. Long term goal 2: Pt will demonstrate improved lumbar and hip AROM to [facility] to allow for improved ability to perform daily tasks with decreased pain. Long term goal 3: Pt will demonstrate improve R hip and core strength to >/= 4+/5 and 3/5 respectively to allow for improved ability to perform daily activities with decreased pain. Long term goal 4: Pt will be indep with HEP to manage symptoms. Long term goal 5: Pt will demonstrate improved RICHARD (Portuguese) <8/50 to demonstrate improved function. Progress toward goals: to be determined     POST-PAIN       Pain Rating (0-10 pain scale):  0 /10   Location and pain description same as pre-treatment unless indicated.    Action: [] NA   [x] Perform HEP  [] Meds as prescribed  [] Modalities as prescribed   [] Call Physician     Frequency/Duration:  Plan  Times per week: 2  Plan weeks: 4-6  Current Treatment Recommendations: Strengthening, Transfer Training, Endurance Training, Neuromuscular Re-education, Patient/Caregiver Education & Training, Equipment Evaluation, Education, & procurement, Manual Therapy - Soft Tissue Mobilization, ROM, Balance Training, Gait Training, Home Exercise Program, Modalities, Positioning, Safety Education & Training, Stair training, Aquatics, Integrated Dry Needling, Functional Mobility Training, Manual Therapy - Joint Manipulation  Plan Comment: Transfer PT POC to Prachi Funes, PT     Pt to continue current HEP. See objective section for any therapeutic exercise changes, additions or modifications this date.     PT Individual Minutes  Time In: 2032  Time Out: 9862  Minutes: 53  Timed Code Treatment Minutes: 43 Minutes  Procedure Minutes: 10 MHP     Timed Activity Minutes Units   Ther Ex 41 3   Manual  2 0       Signature:  Electronically signed by Rebecca Kay PT on 3/22/21 at 9:50 AM EDT

## 2021-03-23 ENCOUNTER — HOSPITAL ENCOUNTER (OUTPATIENT)
Dept: WOMENS IMAGING | Age: 46
Discharge: HOME | End: 2021-03-25
Payer: COMMERCIAL

## 2021-03-23 DIAGNOSIS — Z12.31 SCREENING MAMMOGRAM FOR HIGH-RISK PATIENT: ICD-10-CM

## 2021-03-23 PROCEDURE — 77067 SCR MAMMO BI INCL CAD: CPT

## 2021-03-24 ENCOUNTER — HOSPITAL ENCOUNTER (OUTPATIENT)
Dept: PHYSICAL THERAPY | Age: 46
Setting detail: THERAPIES SERIES
Discharge: HOME | End: 2021-03-24
Payer: COMMERCIAL

## 2021-03-24 PROCEDURE — 97110 THERAPEUTIC EXERCISES: CPT

## 2021-03-24 PROCEDURE — G0283 ELEC STIM OTHER THAN WOUND: HCPCS

## 2021-03-24 ASSESSMENT — PAIN DESCRIPTION - DESCRIPTORS: DESCRIPTORS: TINGLING

## 2021-03-24 ASSESSMENT — PAIN DESCRIPTION - DIRECTION: RADIATING_TOWARDS: R LE

## 2021-03-24 NOTE — PROGRESS NOTES
[id number] [address]  Outpatient Physical Therapy    Treatment Note        Date: 3/24/2021  Patient: Chris Samuel  : 1975  ACCT #: [de-identified]  Referring Practitioner: Olamide Everett DO  Diagnosis: lumbago with sciatica, R side    Visit Information:  PT Visit Information  Onset Date: 21  PT Insurance Information: [employer]  Total # of Visits Approved: 30  Total # of Visits to Date: 3  No Show: 0  Canceled Appointment: 0  Progress Note Counter: 3/8-    Subjective: Pt reports unable to sleep due to increased pain. Pt tearful. Pt reports exercises intermittently decrease pain. Comments: RTD 3/25/21  HEP Compliance:  [x] Good [] Fair [] Poor [] Reports not doing due to:    Vital Signs  Patient Currently in Pain: Yes   Pain Screening  Patient Currently in Pain: Yes  Pain Assessment  Pain Level: 9  Pain Location: Back;Hip;Leg  Pain Radiating Towards: R LE  Pain Descriptors: Tingling(tingling from R hip to foot)  Pain Frequency: Continuous    OBJECTIVE:   Exercise 1: SKTC 10\"x3 with towel B LE  Exercise 2: LTR 5\"x10  Exercise 4: SKTC vs seated lumbar flexion stretch*  Exercise 5: gentle piriformis stretch 30\"x3 with towel (H/L opp knee to chest) and seated figure 4 30\"x3  Exercise 6: TA chai 5\"x10  Exercise 7: DLS UE, LE S19 ea. Exercise 8: H/L hip add 5\"x10 ball- pt unable to tolerate hip abd this date due to increased pain  Exercise 20: HEP: cont current   Increased cues required for exercise technique due to some language barrier    Strength: [x] NT  [] MMT completed:    ROM: [x] NT  [] ROM measurements:    Modalities:  Modalities  Moist heat: x10 to low back to decrease pain and mm spasms  E-stim (parameters): IFC X 10 min lumbar spine for decreased pain     *Indicates exercise, modality, or manual techniques to be initiated when appropriate    Assessment:    Body structures, Functions, Activity limitations: Decreased functional mobility , Decreased ROM, Decreased strength, Increased pain  Assessment: Pt attended session with increased c/o lumbar spine and R radiating pain. Pt exhibiting increased pain with ther ex of TA with R LE march compared to L LE. Pt educated to begin alternating ice and heat at home for improved pain control. Continued hypersensitivity to touch lumbar spine, SI joint R, R lateral hip. Treatment Diagnosis: low back pain with R LE radiating symptoms, impaired lumbar and R hip AROM, impaired core and R hip strength, impaired function  Prognosis: Good       Goals:  Long term goals  Long term goal 1: Pt will demonstrate improved pain level to </=  2/10 to improve QOL. Long term goal 2: Pt will demonstrate improved lumbar and hip AROM to [facility] to allow for improved ability to perform daily tasks with decreased pain. Long term goal 3: Pt will demonstrate improve R hip and core strength to >/= 4+/5 and 3/5 respectively to allow for improved ability to perform daily activities with decreased pain. Long term goal 4: Pt will be indep with HEP to manage symptoms. Long term goal 5: Pt will demonstrate improved RICHARD ([place] Islander) <8/50 to demonstrate improved function. Progress toward goals: on going, pt with increased pain and requires improved pain control for continued progress    POST-PAIN       Pain Rating (0-10 pain scale):  6 /10 less gait antalgia noted post treatment. Location and pain description same as pre-treatment unless indicated.    Action: [] NA   [x] Perform HEP  [] Meds as prescribed  [x] Modalities as prescribed   [] Call Physician     Frequency/Duration:  Plan  Times per week: 2  Plan weeks: 4-6  Current Treatment Recommendations: Strengthening, Transfer Training, Endurance Training, Neuromuscular Re-education, Patient/Caregiver Education & Training, Equipment Evaluation, Education, & procurement, Manual Therapy - Soft Tissue Mobilization, ROM, Balance Training, Gait Training, Home Exercise Program, Modalities, Positioning, Safety Education & Training, Stair training, Aquatics, Integrated Dry Needling, Functional Mobility Training, Manual Therapy - Joint Manipulation  Plan Comment: Transfer PT POC to Giana Costa, PT     Pt to continue current HEP. See objective section for any therapeutic exercise changes, additions or modifications this date.        PT Individual Minutes  Time In: 0801  Time Out: 5131  Minutes: 50   Timed Code Treatment Minutes: 40 Minutes  Procedure Minutes: HP and IFC lumbar spine X 10 min for decreased pain     Timed Activity Minutes Units   Ther Ex 40 3     Signature:  Electronically signed by Leyda Salas PT on 3/24/21 at 8:43 AM EDT  Electronically signed by Leyda Salas PT on 3/24/2021 at 1:57 PM

## 2021-03-29 ENCOUNTER — HOSPITAL ENCOUNTER (OUTPATIENT)
Dept: PHYSICAL THERAPY | Age: 46
Setting detail: THERAPIES SERIES
Discharge: HOME | End: 2021-03-29
Payer: COMMERCIAL

## 2021-03-29 PROCEDURE — 97110 THERAPEUTIC EXERCISES: CPT

## 2021-03-29 ASSESSMENT — PAIN SCALES - GENERAL: PAINLEVEL_OUTOF10: 7

## 2021-03-29 NOTE — PROGRESS NOTES
[address] [address]  Outpatient Physical Therapy    Treatment Note        Date: 3/29/2021  Patient: Evette Samuel  : 1975  ACCT #: [de-identified]  Referring Practitioner: Mila Brown DO  Diagnosis: lumbago with sciatica, R side    Visit Information:  PT Visit Information  Onset Date: 21  PT Insurance Information: Rob Mesa  Total # of Visits Approved: 30  Total # of Visits to Date: 4  No Show: 0  Canceled Appointment: 0  Progress Note Counter:     Subjective: Pt reports no new complaints. Pt stated e-stim decreased pain at last therapy session. Comments: RTD 3/25/21  HEP Compliance:  [x] Good [] Fair [] Poor [] Reports not doing due to:    Vital Signs  Patient Currently in Pain: Yes   Pain Screening  Patient Currently in Pain: Yes  Pain Assessment  Pain Level: 7  Pain Location: Back(lumbar spine)    OBJECTIVE:   Exercises  Exercise 1: SKTC 10\"x3 with towel B LE  Exercise 2: LTR 5\"x10  Exercise 3: walk out X 10- max cues for technique  Exercise 4: SKTC vs seated lumbar flexion stretch*  Exercise 5: gentle piriformis stretch 30\"x3 with towel (H/L opp knee to chest) and seated figure 4 30\"x3  Exercise 6: TA chai 5\"x10  Exercise 7: DLS UE, LE L44 ea. Exercise 8: SLR with DLS X 10 ea  Exercise 9: HS stretch 30\"x3 seated with box  Exercise 20: HEP: walk outs, SLR    Strength: [x] NT  [] MMT completed:    ROM: [x] NT  [] ROM measurements:    Modalities:  Modalities  Moist heat: x10 to low back to decrease pain and mm spasms  E-stim (parameters): IFC X 10 min lumbar spine     *Indicates exercise, modality, or manual techniques to be initiated when appropriate    Assessment: Body structures, Functions, Activity limitations: Decreased functional mobility , Decreased ROM, Decreased strength, Increased pain  Assessment: Able to progress core strengthening this date due to improved pain tolerance and good demonstration of TA tightening.   Pt educated in progression of HEP including walk outs and SLR- pt stated understanding. Treatment Diagnosis: low back pain with R LE radiating symptoms, impaired lumbar and R hip AROM, impaired core and R hip strength, impaired function  Prognosis: Good       Goals:  Long term goals  Long term goal 1: Pt will demonstrate improved pain level to </=  2/10 to improve QOL. Long term goal 2: Pt will demonstrate improved lumbar and hip AROM to [facility] to allow for improved ability to perform daily tasks with decreased pain. Long term goal 3: Pt will demonstrate improve R hip and core strength to >/= 4+/5 and 3/5 respectively to allow for improved ability to perform daily activities with decreased pain. Long term goal 4: Pt will be indep with HEP to manage symptoms. Long term goal 5: Pt will demonstrate improved RICHARD (Swiss) <8/50 to demonstrate improved function. Progress toward goals: ongoing    POST-PAIN       Pain Rating (0-10 pain scale):  0 /10   Location and pain description same as pre-treatment unless indicated. Action: [] NA   [x] Perform HEP  [] Meds as prescribed  [] Modalities as prescribed   [] Call Physician     Frequency/Duration:  Plan  Times per week: 2  Plan weeks: 4-6  Current Treatment Recommendations: Strengthening, Transfer Training, Endurance Training, Neuromuscular Re-education, Patient/Caregiver Education & Training, Equipment Evaluation, Education, & procurement, Manual Therapy - Soft Tissue Mobilization, ROM, Balance Training, Gait Training, Home Exercise Program, Modalities, Positioning, Safety Education & Training, Stair training, Aquatics, Integrated Dry Needling, Functional Mobility Training, Manual Therapy - Joint Manipulation     Pt to continue current HEP. See objective section for any therapeutic exercise changes, additions or modifications this date.     PT Individual Minutes  Time In: 1597  Time Out: 7200  Minutes: 48  Timed Code Treatment Minutes: 38 Minutes  Procedure Minutes: HP/estim 10 min     Timed Activity Minutes Units Ther Ex 38 3       Signature:  Electronically signed by Nhi Rojas, PT on 3/29/21 at 12:11 PM EDT

## 2021-03-31 ENCOUNTER — APPOINTMENT (OUTPATIENT)
Dept: ULTRASOUND IMAGING | Age: 46
End: 2021-03-31
Payer: COMMERCIAL

## 2021-03-31 ENCOUNTER — HOSPITAL ENCOUNTER (OUTPATIENT)
Dept: WOMENS IMAGING | Age: 46
Discharge: HOME | End: 2021-04-02
Payer: COMMERCIAL

## 2021-03-31 DIAGNOSIS — R92.8 ABNORMAL MAMMOGRAM: ICD-10-CM

## 2021-03-31 PROCEDURE — G0279 TOMOSYNTHESIS, MAMMO: HCPCS

## 2021-04-05 ENCOUNTER — HOSPITAL ENCOUNTER (OUTPATIENT)
Dept: PHYSICAL THERAPY | Age: 46
Setting detail: THERAPIES SERIES
Discharge: HOME | End: 2021-04-05
Payer: COMMERCIAL

## 2021-04-05 PROCEDURE — 97110 THERAPEUTIC EXERCISES: CPT

## 2021-04-05 ASSESSMENT — PAIN DESCRIPTION - FREQUENCY: FREQUENCY: CONTINUOUS

## 2021-04-05 ASSESSMENT — PAIN DESCRIPTION - LOCATION: LOCATION: BACK

## 2021-04-05 NOTE — PROGRESS NOTES
[address] [address]  Outpatient Physical Therapy    Treatment Note        Date: 2021  Patient: Selvin Samuel  : 1975  ACCT #: [de-identified]  Referring Practitioner: Stephanie Herrera DO  Diagnosis: lumbago with sciatica, R side    Visit Information:  PT Visit Information  Onset Date: 21  PT Insurance Information: [employer]  Total # of Visits Approved: 30  Total # of Visits to Date: 5  No Show: 0  Canceled Appointment: 0  Progress Note Counter:     Subjective: Patient reports \"the pain is always there, it does not change. \"  Comments: RTD 3/25/21  HEP Compliance:  [x] Good [] Fair [] Poor [] Reports not doing due to:    Vital Signs  Patient Currently in Pain: Yes   Pain Screening  Patient Currently in Pain: Yes  Pain Assessment  Pain Assessment: 0-10  Pain Level: 6  Pain Type: Chronic pain  Pain Location: Back  Pain Orientation: Lower  Pain Descriptors: Aching;Tingling  Pain Frequency: Continuous    OBJECTIVE:   Exercises  Exercise 1: SKTC 20\"x3 B LE  Exercise 2: LTR 10\"x10  Exercise 4: seated lumbar stretch 3 way over pball x10  Exercise 5: gentle piriformis stretch 30\"x3 with towel (H/L opp knee to chest) and seated figure 4 30\"x3  Exercise 6: TA chai 5\"x15  Exercise 7: DLS LE S27, attempted Alt UE/LE- patient unable to tolerate  Exercise 8: SLR with DLS X 10 ea  Exercise 9: HS stretch 30\"x3 seated with box  Modalities:  Modalities  Moist heat: x10 to low back to decrease pain and mm spasms  E-stim (parameters): IFC X 10 min lumbar spine     *Indicates exercise, modality, or manual techniques to be initiated when appropriate    Assessment: Body structures, Functions, Activity limitations: Decreased functional mobility , Decreased ROM, Decreased strength, Increased pain  Assessment: continued current POC with focus on core stabilization and lumbar mobility. Attempted to progress DLS exercises however patient c/o increased pain. Completes stretches with good tolerance.  Requires verbal cues for technique during SLRs. Reports decreased pain post modalities. Treatment Diagnosis: low back pain with R LE radiating symptoms, impaired lumbar and R hip AROM, impaired core and R hip strength, impaired function  Prognosis: Good  Goals:  Long term goals  Long term goal 1: Pt will demonstrate improved pain level to </=  2/10 to improve QOL. Long term goal 2: Pt will demonstrate improved lumbar and hip AROM to [facility] to allow for improved ability to perform daily tasks with decreased pain. Long term goal 3: Pt will demonstrate improve R hip and core strength to >/= 4+/5 and 3/5 respectively to allow for improved ability to perform daily activities with decreased pain. Long term goal 4: Pt will be indep with HEP to manage symptoms. Long term goal 5: Pt will demonstrate improved RICHARD (Swedish) <8/50 to demonstrate improved function. Progress toward goals: continue towards all     POST-PAIN       Pain Rating (0-10 pain scale):  4 /10   Location and pain description same as pre-treatment unless indicated. Action: [] NA   [] Perform HEP  [] Meds as prescribed  [] Modalities as prescribed   [] Call Physician     Frequency/Duration:  Plan  Times per week: 2  Plan weeks: 4-6  Current Treatment Recommendations: Strengthening, Transfer Training, Endurance Training, Neuromuscular Re-education, Patient/Caregiver Education & Training, Equipment Evaluation, Education, & procurement, Manual Therapy - Soft Tissue Mobilization, ROM, Balance Training, Gait Training, Home Exercise Program, Modalities, Positioning, Safety Education & Training, Stair training, Aquatics, Integrated Dry Needling, Functional Mobility Training, Manual Therapy - Joint Manipulation     Pt to continue current HEP. See objective section for any therapeutic exercise changes, additions or modifications this date.     PT Individual Minutes  Time In: 0840  Time Out: 0930  Minutes: 50  Timed Code Treatment Minutes: 40 Minutes  Procedure Minutes:10     Timed Activity Minutes Units   Ther Ex 40 3     Signature:  Electronically signed by Kierra Lindsay PTA on 4/5/21 at 9:03 AM EDT

## 2021-04-07 ENCOUNTER — HOSPITAL ENCOUNTER (OUTPATIENT)
Dept: PHYSICAL THERAPY | Age: 46
Setting detail: THERAPIES SERIES
Discharge: HOME | End: 2021-04-07
Payer: COMMERCIAL

## 2021-04-07 PROCEDURE — 97110 THERAPEUTIC EXERCISES: CPT

## 2021-04-07 ASSESSMENT — PAIN DESCRIPTION - FREQUENCY: FREQUENCY: CONTINUOUS

## 2021-04-07 ASSESSMENT — PAIN DESCRIPTION - LOCATION: LOCATION: BACK

## 2021-04-07 ASSESSMENT — PAIN DESCRIPTION - DESCRIPTORS: DESCRIPTORS: ACHING;TINGLING

## 2021-04-07 ASSESSMENT — PAIN SCALES - GENERAL: PAINLEVEL_OUTOF10: 6

## 2021-04-07 NOTE — PROGRESS NOTES
[address] [address]  Outpatient Physical Therapy    Treatment Note        Date: 2021  Patient: Darlene Samuel  : 1975  ACCT #: [de-identified]  Referring Practitioner: Kimber Toledo DO  Diagnosis: lumbago with sciatica, R side    Visit Information:  PT Visit Information  Onset Date: 21  PT Insurance Information: [employer]  Total # of Visits Approved: 30  Total # of Visits to Date: 6  No Show: 0  Canceled Appointment: 0  Progress Note Counter: -    Subjective: Patient continues to c/o right sided low back pain that radiates to right thigh. Reports temporary relief post therapy sessions. HEP Compliance:  [x] Good [] Fair [] Poor [] Reports not doing due to:    Vital Signs  Patient Currently in Pain: Yes   Pain Screening  Patient Currently in Pain: Yes  Pain Assessment  Pain Assessment: 0-10  Pain Level: 6  Pain Type: Chronic pain  Pain Location: Back  Pain Orientation: Lower  Pain Radiating Towards: Right thigh  Pain Descriptors: Aching;Tingling  Pain Frequency: Continuous    OBJECTIVE:   Exercises  Exercise 1: SKTC 30\"x3 B LE, DKTC with Pball 10\"x10  Exercise 2: LTR 10\"x10  Exercise 4: seated lumbar stretch 3 way over pball x10  Exercise 5: gentle piriformis stretch 30\"x3 with towel (H/L opp knee to chest) and seated figure 4 30\"x3  Exercise 6: TA chai 5\"x15  Exercise 7: DLS LE Y95, attempted Alt UE/LE- patient unable to tolerate  Exercise 8: SLR with DLS X 10 ea  Exercise 9: HS stretch 30\"x3 seated with box  Exercise 10: hip adduction 5\"x10 with ball  Exercise 11: bent knee fall outs x10  ROM: [] NT  [] ROM measurements:  Spine  Lumbar: flexion WNL  (c/o increased pain at end range) SB right 25% (increased pain) left 75%  Modalities:  Modalities  Moist heat: x10 to low back to decrease pain and mm spasms  E-stim (parameters): IFC X 10 min lumbar spine     *Indicates exercise, modality, or manual techniques to be initiated when appropriate  Assessment:    Body structures, Functions, Activity limitations: Decreased functional mobility , Decreased ROM, Decreased strength, Increased pain  Assessment: continued current POC providing gentle progressions for core stabilization and right hip strength. Patient continues to demonstrate fatigue/ quad lag during SLRs. Achieves WNL AROM lumbar flexion however c/o increased pain/tightness at end range. Treatment Diagnosis: low back pain with R LE radiating symptoms, impaired lumbar and R hip AROM, impaired core and R hip strength, impaired function  Prognosis: Good  Goals:  Long term goals  Long term goal 1: Pt will demonstrate improved pain level to </=  2/10 to improve QOL. Long term goal 2: Pt will demonstrate improved lumbar and hip AROM to [facility] to allow for improved ability to perform daily tasks with decreased pain. Long term goal 3: Pt will demonstrate improve R hip and core strength to >/= 4+/5 and 3/5 respectively to allow for improved ability to perform daily activities with decreased pain. Long term goal 4: Pt will be indep with HEP to manage symptoms. Long term goal 5: Pt will demonstrate improved RICHARD (Malawian) <8/50 to demonstrate improved function. Progress toward goals: continue towards all     POST-PAIN       Pain Rating (0-10 pain scale):  4 /10   Location and pain description same as pre-treatment unless indicated.    Action: [] NA   [] Perform HEP  [] Meds as prescribed  [] Modalities as prescribed   [] Call Physician     Frequency/Duration:  Plan  Times per week: 2  Plan weeks: 4-6  Current Treatment Recommendations: Strengthening, Transfer Training, Endurance Training, Neuromuscular Re-education, Patient/Caregiver Education & Training, Equipment Evaluation, Education, & procurement, Manual Therapy - Soft Tissue Mobilization, ROM, Balance Training, Gait Training, Home Exercise Program, Modalities, Positioning, Safety Education & Training, Stair training, Aquatics, Integrated Dry Needling, Functional Mobility Training, Manual Therapy - Joint Manipulation  Plan Comment: Transfer PT POC to Rohan Henley, PT     Pt to continue current HEP. See objective section for any therapeutic exercise changes, additions or modifications this date.   PT Individual Minutes  Time In: 9911  Time Out: 0930  Minutes: 52  Timed Code Treatment Minutes: 42 Minutes  Procedure Minutes:10     Timed Activity Minutes Units   Ther Ex 42 3       Signature:  Electronically signed by Lv Leblanc PTA on 4/7/21 at 9:26 AM EDT

## 2021-04-09 ENCOUNTER — HOSPITAL ENCOUNTER (EMERGENCY)
Age: 46
Discharge: HOME | End: 2021-04-09
Payer: COMMERCIAL

## 2021-04-09 VITALS
TEMPERATURE: 99.1 F | BODY MASS INDEX: 39.56 KG/M2 | SYSTOLIC BLOOD PRESSURE: 136 MMHG | HEART RATE: 74 BPM | HEIGHT: 68 IN | RESPIRATION RATE: 16 BRPM | WEIGHT: 261 LBS | DIASTOLIC BLOOD PRESSURE: 90 MMHG | OXYGEN SATURATION: 100 %

## 2021-04-09 DIAGNOSIS — M54.31 SCIATICA OF RIGHT SIDE: Primary | ICD-10-CM

## 2021-04-09 DIAGNOSIS — S39.012A STRAIN OF LUMBAR REGION, INITIAL ENCOUNTER: ICD-10-CM

## 2021-04-09 PROCEDURE — 99283 EMERGENCY DEPT VISIT LOW MDM: CPT

## 2021-04-09 PROCEDURE — 6360000002 HC RX W HCPCS: Performed by: PHYSICIAN ASSISTANT

## 2021-04-09 PROCEDURE — 96372 THER/PROPH/DIAG INJ SC/IM: CPT

## 2021-04-09 RX ORDER — PREDNISONE 10 MG/1
TABLET ORAL
Qty: 21 TABLET | Refills: 0 | Status: SHIPPED | OUTPATIENT
Start: 2021-04-09

## 2021-04-09 RX ORDER — KETOROLAC TROMETHAMINE 30 MG/ML
60 INJECTION, SOLUTION INTRAMUSCULAR; INTRAVENOUS ONCE
Status: COMPLETED | OUTPATIENT
Start: 2021-04-09 | End: 2021-04-09

## 2021-04-09 RX ORDER — CHLORZOXAZONE 500 MG/1
500 TABLET ORAL 4 TIMES DAILY PRN
Qty: 28 TABLET | Refills: 0 | Status: SHIPPED | OUTPATIENT
Start: 2021-04-09 | End: 2021-04-16

## 2021-04-09 RX ORDER — ORPHENADRINE CITRATE 30 MG/ML
60 INJECTION INTRAMUSCULAR; INTRAVENOUS ONCE
Status: COMPLETED | OUTPATIENT
Start: 2021-04-09 | End: 2021-04-09

## 2021-04-09 RX ORDER — CAPSAICIN 0.025 %
CREAM (GRAM) TOPICAL
Qty: 1 TUBE | Refills: 1 | Status: SHIPPED | OUTPATIENT
Start: 2021-04-09 | End: 2021-05-09

## 2021-04-09 RX ADMIN — ORPHENADRINE CITRATE 60 MG: 30 INJECTION INTRAMUSCULAR; INTRAVENOUS at 15:03

## 2021-04-09 RX ADMIN — KETOROLAC TROMETHAMINE 60 MG: 30 INJECTION, SOLUTION INTRAMUSCULAR at 15:02

## 2021-04-09 ASSESSMENT — PAIN SCALES - GENERAL
PAINLEVEL_OUTOF10: 7
PAINLEVEL_OUTOF10: 7

## 2021-04-09 ASSESSMENT — ENCOUNTER SYMPTOMS
RESPIRATORY NEGATIVE: 1
EYES NEGATIVE: 1
BACK PAIN: 1
GASTROINTESTINAL NEGATIVE: 1

## 2021-04-09 ASSESSMENT — PAIN DESCRIPTION - FREQUENCY: FREQUENCY: CONTINUOUS

## 2021-04-09 ASSESSMENT — PAIN DESCRIPTION - DESCRIPTORS: DESCRIPTORS: ACHING;SHARP

## 2021-04-09 ASSESSMENT — PAIN DESCRIPTION - LOCATION: LOCATION: BACK;LEG

## 2021-04-09 NOTE — ED NOTES
Discharge education reviewed verbally and in writing. Instructed to follow up with PCP and come back to the ED with any new or worsening symptoms. No questions or concerns at this time. No s/s of distress noted at this time.         Jonah Dye RN  04/09/21 9098

## 2021-04-09 NOTE — ED PROVIDER NOTES
[facility] ED  eMERGENCY dEPARTMENT eNCOUnter      Pt Name: Antonio Frost  MRN: [id number]  [place] 1975  Date of evaluation: 4/9/2021  Provider: Verdis Essex, PA-C      HISTORY OF PRESENT ILLNESS    Antonio Frost is a 54 y.o. female who presents to the Emergency Department with chief complaint of low back pain radiating down right buttock area. Patient states she fell down a few stairs about a month ago and injured her lower back area. Patient denies any new injury or trauma. Patient states she had been sore since the fall, but then felt worse over the last 24 hours. Patient denies loss of bowel or bladder function also denies numbness or tingling the private area. Patient not been any medication over-the-counter in the last several days. She has no other concerns at this time. Patient states the pain is in the same location which is more intense and worse. REVIEW OF SYSTEMS       Review of Systems   Constitutional: Negative. HENT: Negative. Eyes: Negative. Respiratory: Negative. Cardiovascular: Negative. Gastrointestinal: Negative. Endocrine: Negative. Genitourinary: Negative. Musculoskeletal: Positive for back pain. Skin: Negative. Neurological: Negative. Psychiatric/Behavioral: Negative.           PAST MEDICAL HISTORY     Past Medical History:   Diagnosis Date    Diabetes mellitus ([address]          SURGICAL HISTORY       Past Surgical History:   Procedure Laterality Date    HERNIA REPAIR           CURRENT MEDICATIONS       Previous Medications    No medications on file       ALLERGIES     Shrimp (diagnostic)    FAMILY HISTORY       Family History   Problem Relation Age of Onset    Breast Cancer Maternal Aunt           SOCIAL HISTORY       Social History     Socioeconomic History    Marital status: Single     Spouse name: None    Number of children: None    Years of education: None    Highest education level: None Occupational History    None   Social Needs    Financial resource strain: None    Food insecurity     Worry: None     Inability: None    Transportation needs     Medical: None     Non-medical: None   Tobacco Use    Smoking status: Current Every Day Smoker     Types: Cigarettes    Smokeless tobacco: Never Used   Substance and Sexual Activity    Alcohol use: No    Drug use: No    Sexual activity: None   Lifestyle    Physical activity     Days per week: None     Minutes per session: None    Stress: None   Relationships    Social connections     Talks on phone: None     Gets together: None     Attends Hindu service: None     Active member of club or organization: None     Attends meetings of clubs or organizations: None     Relationship status: None    Intimate partner violence     Fear of current or ex partner: None     Emotionally abused: None     Physically abused: None     Forced sexual activity: None   Other Topics Concern    None   Social History Narrative    None       SCREENINGS    [place] Coma Scale  Eye Opening: Spontaneous  Best Verbal Response: Oriented  Best Motor Response: Obeys commands  [place]eli Coma Scale Score: 15 @FLOW([id number])@      PHYSICAL EXAM    (up to 7 for level 4, 8 or more for level 5)     ED Triage Vitals [04/09/21 1426]   BP Temp Temp Source Pulse Resp SpO2 Height Weight   (!) 136/90 99.1 °F (37.3 °C) Oral 74 16 100 % 5' 8\" (1.727 m) 261 lb (118.4 kg)       Physical Exam  Constitutional:       General: She is not in acute distress. Appearance: She is well-developed. HENT:      Head: Normocephalic and atraumatic. Eyes:      Conjunctiva/sclera: Conjunctivae normal.      Pupils: Pupils are equal, round, and reactive to light. Neck:      Musculoskeletal: Normal range of motion and neck supple. Cardiovascular:      Rate and Rhythm: Normal rate and regular rhythm. Heart sounds: No murmur. Pulmonary:      Effort: No respiratory distress.       Breath sounds: Normal breath sounds. No wheezing or rales. Abdominal:      General: There is no distension. Palpations: Abdomen is soft. Tenderness: There is no abdominal tenderness. Musculoskeletal: Normal range of motion. Lumbar back: She exhibits tenderness and bony tenderness. Back:       Right upper leg: She exhibits tenderness. Legs:    Skin:     General: Skin is warm and dry. Findings: No erythema or rash. Neurological:      Mental Status: She is alert and oriented to person, place, and time. Cranial Nerves: No cranial nerve deficit. Psychiatric:         Judgment: Judgment normal.           All other labs were within normal range or not returned as of this dictation. EMERGENCY DEPARTMENT COURSE and DIFFERENTIALDIAGNOSIS/MDM:   Vitals:    Vitals:    04/09/21 1426   BP: (!) 136/90   Pulse: 74   Resp: 16   Temp: 99.1 °F (37.3 °C)   TempSrc: Oral   SpO2: 100%   Weight: 261 lb (118.4 kg)   Height: 5' 8\" (1.727 m)          Norflex and Toradol injections for pain and muscle relaxation while in the emergency room. X-rays reviewed from previous visit and negative fracture noted. Patient denies any new injury or trauma. Patient will be discharged home on similar medications for treatment. Patient follow-up with primary care provider for reevaluation and treatment. Return here if symptoms worsen or if new concerning symptoms arise. Patient verbalizes understanding of plan at discharge is no further questions. PROCEDURES:  Unless otherwise noted below, none     Procedures      FINAL IMPRESSION      1. Sciatica of right side    2.  Strain of lumbar region, initial encounter          DISPOSITION/PLAN   DISPOSITION            Lupe Calix PA-C (electronically signed)  Attending Emergency Physician  [facility]  04/09/21 7333

## 2021-04-09 NOTE — ED TRIAGE NOTES
Pt c/o back pain that radiates into her RLE, denies trauma, sensation and movement intact throughout extremities, Pt is A&OX3, calm, ambulatory, afebrile, breathes are equal and unlabored.

## 2021-04-12 ENCOUNTER — HOSPITAL ENCOUNTER (OUTPATIENT)
Dept: PHYSICAL THERAPY | Age: 46
Setting detail: THERAPIES SERIES
Discharge: HOME | End: 2021-04-12
Payer: COMMERCIAL

## 2021-04-12 PROCEDURE — 97110 THERAPEUTIC EXERCISES: CPT

## 2021-04-12 PROCEDURE — G0283 ELEC STIM OTHER THAN WOUND: HCPCS

## 2021-04-12 ASSESSMENT — PAIN DESCRIPTION - PAIN TYPE: TYPE: CHRONIC PAIN

## 2021-04-12 NOTE — PROGRESS NOTES
sensation  Assessment: continued current POC with focus on core stabilization and right hip strength. Patient c/o increased pain during SLR, quick fatigue observed. Improving tolerance to piriformis stretch. Reports decrease pain/tightness post session. Prognosis: Good  Goals:  Long term goals  Long term goal 1: Pt will demonstrate improved pain level to </=  2/10 to improve QOL. Long term goal 2: Pt will demonstrate improved lumbar and hip AROM to [facility] to allow for improved ability to perform daily tasks with decreased pain. Long term goal 3: Pt will demonstrate improve R hip and core strength to >/= 4+/5 and 3/5 respectively to allow for improved ability to perform daily activities with decreased pain. Long term goal 4: Pt will be indep with HEP to manage symptoms. Long term goal 5: Pt will demonstrate improved RICHARD (Bolivian) <8/50 to demonstrate improved function. Progress toward goals: continue towards all     POST-PAIN       Pain Rating (0-10 pain scale):  4 /10   Location and pain description same as pre-treatment unless indicated. Action: [] NA   [x] Perform HEP  [] Meds as prescribed  [] Modalities as prescribed   [] Call Physician     Frequency/Duration:  Plan  Times per week: 2  Plan weeks: 4-6  Current Treatment Recommendations: Strengthening, Transfer Training, Endurance Training, Neuromuscular Re-education, Patient/Caregiver Education & Training, Equipment Evaluation, Education, & procurement, Manual Therapy - Soft Tissue Mobilization, ROM, Balance Training, Gait Training, Home Exercise Program, Modalities, Positioning, Safety Education & Training, Stair training, Aquatics, Integrated [facility], Functional Mobility Training, Manual Therapy - Joint Manipulation  Plan Comment: Transfer PT POC to Pablo Yang, PT     Pt to continue current HEP. See objective section for any therapeutic exercise changes, additions or modifications this date.     PT Individual Minutes  Time In: 0840  Time Out: 0930  Minutes: 50  Timed Code Treatment Minutes: 40 Minutes  Procedure Minutes:10     Timed Activity Minutes Units   Ther Ex 40 3       Signature:  Electronically signed by Gordy Betancourt PTA on 4/12/21 at 10:05 AM EDT

## 2021-04-14 ENCOUNTER — HOSPITAL ENCOUNTER (OUTPATIENT)
Dept: PHYSICAL THERAPY | Age: 46
Setting detail: THERAPIES SERIES
Discharge: HOME | End: 2021-04-14
Payer: COMMERCIAL

## 2021-04-14 PROCEDURE — 97110 THERAPEUTIC EXERCISES: CPT

## 2021-04-14 PROCEDURE — G0283 ELEC STIM OTHER THAN WOUND: HCPCS

## 2021-04-14 ASSESSMENT — PAIN DESCRIPTION - FREQUENCY: FREQUENCY: CONTINUOUS

## 2021-04-14 NOTE — PROGRESS NOTES
[address] [address]  Outpatient Physical Therapy    Treatment Note        Date: 2021  Patient: Martha Samuel  : 1975  ACCT #: [de-identified]  Referring Practitioner: Fabiano Jain DO  Diagnosis: lumbago with sciatica, R side    Visit Information:  PT Visit Information  Onset Date: 21  PT Insurance Information: [employer]  Total # of Visits Approved: 30  Total # of Visits to Date: 8  No Show: 0  Canceled Appointment: 0  Progress Note Counter: -    Subjective: Patient reports low back pain has decreased since last visit. Continues to c/o RLE numbness/shooting pain from hip to toes. HEP Compliance:  [x] Good [] Fair [] Poor [] Reports not doing due to:    Vital Signs  Patient Currently in Pain: Yes   Pain Screening  Patient Currently in Pain: Yes  Pain Assessment  Pain Assessment: 0-10  Pain Level: 4  Pain Location: Back  Pain Orientation: Lower  Pain Descriptors: Tightness; Aching; Shooting;Tingling  Pain Frequency: Continuous    OBJECTIVE:   Exercises  Exercise 1: SKTC 30\"x3 B LE, DKTC with Pball 10\"x10  Exercise 2: LTR 10\"x10  Exercise 4: seated lumbar stretch 3 way over pball x10  Exercise 5: gentle piriformis stretch 30\"x3 with towel (H/L opp knee to chest) and seated figure 4 30\"x3  Exercise 6: TA chai - D/C HEP  Exercise 7: DLS LE S14, UE U57, Alt opp LE/UE x10  Exercise 8: SLR with DLS X 15 ea  Exercise 9: HS stretch 30\"x3 seated with box  Exercise 10: hip adduction 5\"x15 with ball  Exercise 11: bent knee fall outs x15  Exercise 12: clams/ reverse clams x10    Strength: [] NT  [] MMT completed:  Strength RLE  Strength RLE: Exception  Comment: SLR 4-/5  R Hip Flexion: 4/5  R Hip ABduction: 4-/5  R Knee Flexion: 4/5  R Knee Extension: 4+/5  Modalities:  Modalities  Moist heat: x10 to low back to decrease pain and mm spasms  E-stim (parameters): IFC X 10 min lumbar spine     *Indicates exercise, modality, or manual techniques to be initiated when appropriate    Assessment:    Body structures, Functions, Activity limitations: Decreased functional mobility , Decreased ROM, Decreased strength, Increased pain, Decreased sensation  Assessment: continued current POC progressing exercises for core stability and right hip strength. Patient previously unable to tolerate alt UE/LE during DLS. Completes without increased pain this date. Right hip strength improved since evaluation. Prognosis: Good  Goals:  Long term goals  Long term goal 1: Pt will demonstrate improved pain level to </=  2/10 to improve QOL. Long term goal 2: Pt will demonstrate improved lumbar and hip AROM to [facility] to allow for improved ability to perform daily tasks with decreased pain. Long term goal 3: Pt will demonstrate improve R hip and core strength to >/= 4+/5 and 3/5 respectively to allow for improved ability to perform daily activities with decreased pain. Long term goal 4: Pt will be indep with HEP to manage symptoms. Long term goal 5: Pt will demonstrate improved RICHARD (Turkish) <8/50 to demonstrate improved function. Progress toward goals:continue towards all, progressing towards strength goal  POST-PAIN       Pain Rating (0-10 pain scale):   0/10   Location and pain description same as pre-treatment unless indicated.    Action: [] NA   [] Perform HEP  [] Meds as prescribed  [] Modalities as prescribed   [] Call Physician     Frequency/Duration:  Plan  Times per week: 2  Plan weeks: 4-6  Current Treatment Recommendations: Strengthening, Transfer Training, Endurance Training, Neuromuscular Re-education, Patient/Caregiver Education & Training, Equipment Evaluation, Education, & procurement, Manual Therapy - Soft Tissue Mobilization, ROM, Balance Training, Gait Training, Home Exercise Program, Modalities, Positioning, Safety Education & Training, Stair training, Aquatics, Integrated [facility], Functional Mobility Training, Manual Therapy - Joint Manipulation  Plan Comment: Transfer PT POC to Wendy Weaver, PT     Pt to continue current HEP. See objective section for any therapeutic exercise changes, additions or modifications this date.          PT Individual Minutes  Time In: 0840  Time Out: 0930  Minutes: 50  Timed Code Treatment Minutes: 40 Minutes  Procedure Minutes:10     Timed Activity Minutes Units   Ther Ex 40 3       Signature:  Electronically signed by Zaria Tyler PTA on 4/14/21 at 11:00 AM EDT

## 2021-04-19 ENCOUNTER — HOSPITAL ENCOUNTER (OUTPATIENT)
Dept: PHYSICAL THERAPY | Age: 46
Setting detail: THERAPIES SERIES
Discharge: HOME | End: 2021-04-19
Payer: COMMERCIAL

## 2021-04-19 PROCEDURE — 97110 THERAPEUTIC EXERCISES: CPT

## 2021-04-19 PROCEDURE — G0283 ELEC STIM OTHER THAN WOUND: HCPCS

## 2021-04-19 ASSESSMENT — PAIN DESCRIPTION - PAIN TYPE: TYPE: CHRONIC PAIN

## 2021-04-19 NOTE — PROGRESS NOTES
[address] [address]  Outpatient Physical Therapy    Treatment Note        Date: 2021  Patient: Ben Samuel  : 1975  ACCT #: [de-identified]  Referring Practitioner: Francine Mejia DO  Diagnosis: lumbago with sciatica, R side    Visit Information:  PT Visit Information  Onset Date: 21  PT Insurance Information: [employer]  Total # of Visits Approved: 30  Total # of Visits to Date: 9  No Show: 0  Canceled Appointment: 0  Progress Note Counter: -    Subjective: Pt presenting to appt reporting 5/10 \"sometimes it's high and sometimes low but it's always there. \" Pt cont's to reports Rt>Lt sided LBP w/ tingling and pressure into Rt LE. Pt notes exercsies to be helping at home.      HEP Compliance:  [x] Good [] Fair [] Poor [] Reports not doing due to:    Vital Signs  Patient Currently in Pain: Yes   Pain Screening  Patient Currently in Pain: Yes  Pain Assessment  Pain Assessment: 0-10  Pain Level: 5  Pain Type: Chronic pain  Pain Location: Back;Leg  Pain Orientation: Lower  Pain Descriptors: Pressure;Tingling;Aching    OBJECTIVE:   Exercises  Exercise 1: SKTC 30\"x3 B LE, DKTC with Pball w/ towel 3x30\"  Exercise 2: LTR 10\"x10  Exercise 4: seated lumbar stretch 3 way over pball x10  Exercise 5: gentle piriformis stretch 30\"x3 with towel (H/L opp knee to chest) and seated figure 4 30\"x3  Exercise 6: Pball bridge w/ TA 5\"x10 (limited range), Pball DKTC 5\"x10  Exercise 7: DLS LE B98, UE Y44, Alt opp LE/UE x10  Exercise 8: SLR with DLS X 15 ea  Exercise 9: HS stretch 30\"x3 seated with box  Exercise 11: bent knee fall outs x15  Exercise 12: clams/ reverse clams x10  Exercise 13: S/L hip ABD x10 b/l  Exercise 20: HEP: S/L hip ABD, bridges ([place] citizen version)     Strength: [x] NT  [] MMT completed:     ROM: [x] NT  [] ROM measurements:    Modalities:  Modalities  Moist heat: x10 to low back to decrease pain and mm spasms  E-stim (parameters): IFC X 10 min lumbar spine     *Indicates exercise, modality, or manual techniques to be initiated when appropriate    Assessment: Body structures, Functions, Activity limitations: Decreased functional mobility , Decreased ROM, Decreased strength, Increased pain, Decreased sensation  Assessment: Further progression of hip/core strengthening w/ addition of PBall bridges/HSC's and S/L hip ABD. Pt completing tx w/o complaint during exs though visual fatigue demonstated w/ S/L ABD. Pt concluding tx noting decrease from initial pain. Prognosis: Good     Goals:  Long term goals  Long term goal 1: Pt will demonstrate improved pain level to </=  2/10 to improve QOL. Long term goal 2: Pt will demonstrate improved lumbar and hip AROM to [facility] to allow for improved ability to perform daily tasks with decreased pain. Long term goal 3: Pt will demonstrate improve R hip and core strength to >/= 4+/5 and 3/5 respectively to allow for improved ability to perform daily activities with decreased pain. Long term goal 4: Pt will be indep with HEP to manage symptoms. Long term goal 5: Pt will demonstrate improved RICHARD (South African) <8/50 to demonstrate improved function. Progress toward goals:  Hip/core strength     POST-PAIN       Pain Rating (0-10 pain scale):   3/10   Location and pain description same as pre-treatment unless indicated.    Action: [] NA   [x] Perform HEP  [] Meds as prescribed  [x] Modalities as prescribed   [] Call Physician     Frequency/Duration:  Plan  Times per week: 2  Plan weeks: 4-6  Current Treatment Recommendations: Strengthening, Transfer Training, Endurance Training, Neuromuscular Re-education, Patient/Caregiver Education & Training, Equipment Evaluation, Education, & procurement, Manual Therapy - Soft Tissue Mobilization, ROM, Balance Training, Gait Training, Home Exercise Program, Modalities, Positioning, Safety Education & Training, Stair training, Aquatics, Integrated Dry Needling, Functional Mobility Training, Manual Therapy - Joint

## 2021-04-21 ENCOUNTER — HOSPITAL ENCOUNTER (EMERGENCY)
Age: 46
Discharge: HOME | End: 2021-04-21
Payer: COMMERCIAL

## 2021-04-21 VITALS
TEMPERATURE: 98.4 F | BODY MASS INDEX: 41.78 KG/M2 | WEIGHT: 260 LBS | RESPIRATION RATE: 18 BRPM | DIASTOLIC BLOOD PRESSURE: 77 MMHG | HEART RATE: 71 BPM | OXYGEN SATURATION: 100 % | SYSTOLIC BLOOD PRESSURE: 131 MMHG | HEIGHT: 66 IN

## 2021-04-21 DIAGNOSIS — K04.7 DENTAL ABSCESS: Primary | ICD-10-CM

## 2021-04-21 PROCEDURE — 99281 EMR DPT VST MAYX REQ PHY/QHP: CPT

## 2021-04-21 RX ORDER — LIDOCAINE HYDROCHLORIDE 20 MG/ML
5 SOLUTION OROPHARYNGEAL PRN
Qty: 100 ML | Refills: 0 | Status: SHIPPED | OUTPATIENT
Start: 2021-04-21

## 2021-04-21 RX ORDER — NAPROXEN 500 MG/1
500 TABLET ORAL 2 TIMES DAILY
Qty: 20 TABLET | Refills: 0 | Status: SHIPPED | OUTPATIENT
Start: 2021-04-21 | End: 2021-05-01

## 2021-04-21 RX ORDER — PENICILLIN V POTASSIUM 500 MG/1
500 TABLET ORAL 4 TIMES DAILY
Qty: 40 TABLET | Refills: 0 | Status: SHIPPED | OUTPATIENT
Start: 2021-04-21 | End: 2021-05-01

## 2021-04-21 ASSESSMENT — ENCOUNTER SYMPTOMS
TROUBLE SWALLOWING: 0
VOMITING: 0
DIARRHEA: 0
ABDOMINAL PAIN: 0
SORE THROAT: 0
NAUSEA: 0
SHORTNESS OF BREATH: 0
BACK PAIN: 0
COUGH: 0

## 2021-04-21 ASSESSMENT — PAIN DESCRIPTION - ONSET
ONSET_2: ON-GOING
ONSET: ON-GOING

## 2021-04-21 ASSESSMENT — PAIN DESCRIPTION - PAIN TYPE: TYPE: ACUTE PAIN

## 2021-04-21 ASSESSMENT — PAIN DESCRIPTION - PROGRESSION: CLINICAL_PROGRESSION: NOT CHANGED

## 2021-04-21 ASSESSMENT — PAIN DESCRIPTION - DURATION: DURATION_2: CONTINUOUS

## 2021-04-21 ASSESSMENT — PAIN DESCRIPTION - INTENSITY: RATING_2: 8

## 2021-04-21 ASSESSMENT — PAIN DESCRIPTION - LOCATION: LOCATION: MOUTH

## 2021-04-21 ASSESSMENT — PAIN DESCRIPTION - ORIENTATION: ORIENTATION_2: RIGHT;ANTERIOR

## 2021-04-21 ASSESSMENT — PAIN DESCRIPTION - FREQUENCY: FREQUENCY: CONTINUOUS

## 2021-04-21 NOTE — ED PROVIDER NOTES
[address] [facility] ED  eMERGENCY dEPARTMENT eNCOUnter      Pt Name: Cheryl Alberto  MRN: [id number]  [place] 1975  Date of evaluation: 4/21/2021  Provider: Lucina Lennox, APRN - CNP      HISTORY OF PRESENT ILLNESS    Cheryl Alberto is a 54 y.o. female who presents to the Emergency Department with R upper gum abscess x 3 days. Patient states it is causing her to have headache and R ear pain. Pain is moderate. Denies trouble swallowing and no drooling. Patient has a dentist appointment on 4/26. REVIEW OF SYSTEMS       Review of Systems   Constitutional: Negative for activity change, appetite change and fever. HENT: Positive for dental problem. Negative for congestion, drooling, sore throat and trouble swallowing. Respiratory: Negative for cough and shortness of breath. Cardiovascular: Negative for chest pain. Gastrointestinal: Negative for abdominal pain, diarrhea, nausea and vomiting. Genitourinary: Negative for dysuria. Musculoskeletal: Negative for arthralgias and back pain. Skin: Negative for rash. All other systems reviewed and are negative. PAST MEDICAL HISTORY     Past Medical History:   Diagnosis Date    Diabetes mellitus ([facility] [address]          SURGICAL HISTORY       Past Surgical History:   Procedure Laterality Date    HERNIA REPAIR           CURRENT MEDICATIONS       Previous Medications    CAPSAICIN (ZOSTRIX) 0.025 % CREAM    Apply topically 2 times daily to tender back area.     PREDNISONE (DELTASONE) 10 MG TABLET    6 tablets by mouth on day 1 and decrease by 1 tablet daily until gone       ALLERGIES     Shrimp (diagnostic)    FAMILY HISTORY       Family History   Problem Relation Age of Onset    Breast Cancer Maternal Aunt           SOCIAL HISTORY       Social History     Socioeconomic History    Marital status: Single     Spouse name: Not on file    Number of children: Not on file    Years of education: Not on file    Highest education level: Not on file   Occupational History    Not on file   Social Needs    Financial resource strain: Not on file    Food insecurity     Worry: Not on file     Inability: Not on file    Transportation needs     Medical: Not on file     Non-medical: Not on file   Tobacco Use    Smoking status: Current Every Day Smoker     Types: Cigarettes    Smokeless tobacco: Never Used   Substance and Sexual Activity    Alcohol use: No    Drug use: No    Sexual activity: Not on file   Lifestyle    Physical activity     Days per week: Not on file     Minutes per session: Not on file    Stress: Not on file   Relationships    Social connections     Talks on phone: Not on file     Gets together: Not on file     Attends Yarsanism service: Not on file     Active member of club or organization: Not on file     Attends meetings of clubs or organizations: Not on file     Relationship status: Not on file    Intimate partner violence     Fear of current or ex partner: Not on file     Emotionally abused: Not on file     Physically abused: Not on file     Forced sexual activity: Not on file   Other Topics Concern    Not on file   Social History Narrative    Not on file       SCREENINGS      @CLLL([id number])@      PHYSICAL EXAM    (up to 7 for level 4, 8 or more for level 5)     ED Triage Vitals [04/21/21 1036]   BP Temp Temp Source Pulse Resp SpO2 Height Weight   131/77 98.4 °F (36.9 °C) Oral 71 18 100 % 5' 6\" (1.676 m) 260 lb (117.9 kg)       Physical Exam  Vitals signs and nursing note reviewed. Constitutional:       Appearance: She is well-developed. HENT:      Head: Normocephalic and atraumatic. Right Ear: Hearing, tympanic membrane, ear canal and external ear normal.      Left Ear: Hearing, tympanic membrane, ear canal and external ear normal.      Nose: Nose normal.      Mouth/Throat:      Lips: Pink. Mouth: Mucous membranes are moist.      Pharynx: Oropharynx is clear. Uvula midline.    Eyes:      Conjunctiva/sclera: Conjunctivae normal.      Pupils: Pupils are equal, round, and reactive to light. Neck:      Musculoskeletal: Normal range of motion and neck supple. Cardiovascular:      Rate and Rhythm: Normal rate and regular rhythm. Pulmonary:      Effort: Pulmonary effort is normal.      Breath sounds: Normal breath sounds. Abdominal:      General: Bowel sounds are normal. There is no distension. Palpations: Abdomen is soft. Tenderness: There is no abdominal tenderness. Musculoskeletal: Normal range of motion. Skin:     General: Skin is warm and dry. Neurological:      Mental Status: She is alert and oriented to person, place, and time. Deep Tendon Reflexes: Reflexes are normal and symmetric. Psychiatric:         Judgment: Judgment normal.           All other labs were within normal range or not returned as of this dictation. EMERGENCY DEPARTMENT COURSE and DIFFERENTIALDIAGNOSIS/MDM:   Vitals:    Vitals:    04/21/21 1036   BP: 131/77   Pulse: 71   Resp: 18   Temp: 98.4 °F (36.9 °C)   TempSrc: Oral   SpO2: 100%   Weight: 260 lb (117.9 kg)   Height: 5' 6\" (1.676 m)            54 yr old female with dental pain d/t dental abscess. Prescriptions for Naprosyn, Lidocaine viscous and PEN VK were sent to the pharmacy. Patient to F/U With dentist as scheduled. Patient verbalizes understanding. PROCEDURES:  Unless otherwise noted below, none     Procedures      FINAL IMPRESSION      1.  Dental abscess          DISPOSITION/PLAN   DISPOSITION Decision To Discharge 04/21/2021 11:20:49 AM          CHAPINCITO Marley CNP (electronically signed)  Attending Emergency Physician     CHAPINCITO Marley CNP  04/21/21 [id number]

## 2021-04-21 NOTE — ED TRIAGE NOTES
Patient to ER reports abscess in the roof of her mouth x 3 days making eating and drinking difficult. She also has a headache x 3 days on the front, right side. Ear ache on the right side. No dizziness, n/v/d. No fevers at home. No medications PTA.

## 2021-05-14 ENCOUNTER — HOSPITAL ENCOUNTER (OUTPATIENT)
Dept: PHYSICAL THERAPY | Age: 46
Setting detail: THERAPIES SERIES
Discharge: HOME | End: 2021-05-14
Payer: COMMERCIAL

## 2021-05-14 PROCEDURE — 97110 THERAPEUTIC EXERCISES: CPT

## 2021-05-14 PROCEDURE — G0283 ELEC STIM OTHER THAN WOUND: HCPCS

## 2021-05-14 ASSESSMENT — PAIN DESCRIPTION - DESCRIPTORS: DESCRIPTORS: ACHING

## 2021-05-14 ASSESSMENT — PAIN DESCRIPTION - LOCATION: LOCATION: BACK

## 2021-05-14 NOTE — PROGRESS NOTES
mobility , Decreased ROM, Decreased strength, Increased pain, Decreased sensation  Assessment: Pt tolerating ther ex without complaints of pain. Needs much verbal cues and guidance to perform however improved form noted throughout ex's and decreasing pain levels reported. .     Prognosis: Good       Goals:     Long term goals  Long term goal 1: Pt will demonstrate improved pain level to </=  2/10 to improve QOL. Long term goal 2: Pt will demonstrate improved lumbar and hip AROM to [facility] to allow for improved ability to perform daily tasks with decreased pain. Long term goal 3: Pt will demonstrate improve R hip and core strength to >/= 4+/5 and 3/5 respectively to allow for improved ability to perform daily activities with decreased pain. Long term goal 4: Pt will be indep with HEP to manage symptoms. Long term goal 5: Pt will demonstrate improved RICHARD (Slovenian) <8/50 to demonstrate improved function. Progress toward goals:ongoing, working on ROM and strengthening to improve overall stability    POST-PAIN       Pain Rating (0-10 pain scale):   5/10   Location and pain description same as pre-treatment unless indicated. Action: [x] NA   [] Perform HEP  [] Meds as prescribed  [] Modalities as prescribed   [] Call Physician     Frequency/Duration:  Plan  Times per week: 2  Plan weeks: 4-6  Current Treatment Recommendations: Strengthening, Transfer Training, Endurance Training, Neuromuscular Re-education, Patient/Caregiver Education & Training, Equipment Evaluation, Education, & procurement, Manual Therapy - Soft Tissue Mobilization, ROM, Balance Training, Gait Training, Home Exercise Program, Modalities, Positioning, Safety Education & Training, Stair training, Aquatics, Integrated [employer], Functional Mobility Training, Manual Therapy - Joint Manipulation  Plan Comment: Transfer PT POC to [place], PT     Pt to continue current HEP.   See objective section for any therapeutic exercise changes, additions or

## 2021-05-20 ENCOUNTER — HOSPITAL ENCOUNTER (OUTPATIENT)
Dept: PHYSICAL THERAPY | Age: 46
Setting detail: THERAPIES SERIES
Discharge: HOME | End: 2021-05-20
Payer: COMMERCIAL

## 2021-05-20 PROCEDURE — 97110 THERAPEUTIC EXERCISES: CPT

## 2021-05-20 ASSESSMENT — PAIN DESCRIPTION - ORIENTATION: ORIENTATION: RIGHT

## 2021-05-20 ASSESSMENT — PAIN DESCRIPTION - DESCRIPTORS: DESCRIPTORS: ACHING

## 2021-05-20 ASSESSMENT — PAIN DESCRIPTION - PAIN TYPE: TYPE: CHRONIC PAIN

## 2021-05-20 ASSESSMENT — PAIN DESCRIPTION - FREQUENCY: FREQUENCY: CONTINUOUS

## 2021-05-20 NOTE — PROGRESS NOTES
mobility , Decreased ROM, Decreased strength, Increased pain, Decreased sensation  Assessment: continued current POC with focus on decreased pain and core stability. Patient demonstrates slight improvement in hip/core strength since last tested. Continues to c/o increased right hip/low back pain  during manual muscle testing. requires verbal cues for core engagement during exercises. Prognosis: Good    Goals:  Long term goals  Long term goal 1: Pt will demonstrate improved pain level to </=  2/10 to improve QOL. Long term goal 2: Pt will demonstrate improved lumbar and hip AROM to [facility] to allow for improved ability to perform daily tasks with decreased pain. Long term goal 3: Pt will demonstrate improve R hip and core strength to >/= 4+/5 and 3/5 respectively to allow for improved ability to perform daily activities with decreased pain. Long term goal 4: Pt will be indep with HEP to manage symptoms. Long term goal 5: Pt will demonstrate improved RICHARD (Irish) <8/50 to demonstrate improved function. Progress toward goals: continue towards all     POST-PAIN       Pain Rating (0-10 pain scale):   4/10   Location and pain description same as pre-treatment unless indicated. Action: [] NA   [x] Perform HEP  [] Meds as prescribed  [] Modalities as prescribed   [] Call Physician     Frequency/Duration:  Plan  Times per week: 2  Plan weeks: 4-6  Current Treatment Recommendations: Strengthening, Transfer Training, Endurance Training, Neuromuscular Re-education, Patient/Caregiver Education & Training, Equipment Evaluation, Education, & procurement, Manual Therapy - Soft Tissue Mobilization, ROM, Balance Training, Gait Training, Home Exercise Program, Modalities, Positioning, Safety Education & Training, Stair training, Aquatics, [facility], Functional Mobility Training, Manual Therapy - Joint Manipulation  Plan Comment: Transfer PT POC to Marilee England, PT     Pt to continue current HEP.   See objective section for any therapeutic exercise changes, additions or modifications this date.   PT Individual Minutes  Time In: 0848  Time Out: 5808  Minutes: 48  Timed Code Treatment Minutes: 38 Minutes  Procedure Minutes: 10     Timed Activity Minutes Units   Ther Ex 38 3       Signature:  Electronically signed by Sylvia Mueller PTA on 5/20/21 at 11:29 AM EDT

## 2021-05-26 ENCOUNTER — HOSPITAL ENCOUNTER (OUTPATIENT)
Dept: PHYSICAL THERAPY | Age: 46
Setting detail: THERAPIES SERIES
Discharge: HOME | End: 2021-05-26
Payer: COMMERCIAL

## 2021-05-26 PROCEDURE — 97110 THERAPEUTIC EXERCISES: CPT

## 2021-05-26 ASSESSMENT — PAIN DESCRIPTION - DESCRIPTORS: DESCRIPTORS: ACHING

## 2021-05-26 ASSESSMENT — PAIN SCALES - GENERAL: PAINLEVEL_OUTOF10: 6

## 2021-05-26 ASSESSMENT — PAIN DESCRIPTION - LOCATION: LOCATION: BACK

## 2021-05-26 NOTE — PROGRESS NOTES
[address] [address]  Outpatient Physical Therapy    Treatment Note        Date: 2021  Patient: Susan Samuel  : 1975  ACCT #: [de-identified]  Referring Practitioner: Bryan Weber DO  Diagnosis: lumbago with sciatica, R side    Visit Information:  PT Visit Information  Onset Date: 21  PT Insurance Information: Didier Ring  Total # of Visits Approved: 30  Total # of Visits to Date: 12  No Show: 1  Canceled Appointment: 0  Progress Note Counter: -    Subjective: Patient continues to reports temporary relief after therapy sessions. Feels flexibility and mobility continue to get better. Feels approx 50% of normal functional level.   Comments:  [id number]  HEP Compliance:  [x] Good [] Fair [] Poor [] Reports not doing due to:    Vital Signs  Patient Currently in Pain: Yes   Pain Screening  Patient Currently in Pain: Yes  Pain Assessment  Pain Assessment: 0-10  Pain Level: 6  Pain Location: Back  Pain Orientation: Right  Pain Descriptors: Aching  Pain Frequency: Continuous    OBJECTIVE:   Exercises  Exercise 1: SKTC 30\"x3 B LE, DKTC with Pball w/ towel 3x30\"  Exercise 2: LTR 10\"x10 with Pball  Exercise 5: gentle piriformis stretch 30\"x3 seated  Exercise 6: MIGUEL ANGEL (with poor tolerance)    Strength: [] NT  [] MMT completed:  Strength RLE  Comment: SLR 4/5  R Hip Flexion: 4+/5  R Hip Extension: 3-/5  R Hip ABduction: 4-/5  R Hip Internal Rotation: 3+/5  R Hip External Rotation: 4/5  R Knee Flexion: 4+/5  R Knee Extension: 4+/5  Strength Other  Other: abdominals isos fair strength, lower abdominal TVA poor strength    ROM: [] NT  [] ROM measurements:     AROM RLE (degrees)  RLE General AROM: IR 15, ER 40 seated, unable to assess in supine due to pain     AROM LLE (degrees)  LLE General AROM: IR 32, ER 35     Spine  Lumbar: flexion 75% (c/o increased pain at end range) SB right 25% (increased pain) left 50%, extension WNL  Modalities:  Modalities  Moist heat: x10 to low back to decrease Equipment Evaluation, Education, & procurement, Manual Therapy - Soft Tissue Mobilization, ROM, Balance Training, Gait Training, Home Exercise Program, Modalities, Positioning, Safety Education & Training, Stair training, Aquatics, Integrated Dry Needling, Functional Mobility Training, Manual Therapy - Joint Manipulation  Plan Comment: Transfer PT POC to Hollie Hurtado, PT     Pt to continue current HEP. See objective section for any therapeutic exercise changes, additions or modifications this date.     PT Individual Minutes  Time In: 0840  Time Out: 0930  Minutes: 50  Timed Code Treatment Minutes: 40 Minutes  Procedure Minutes:10     Timed Activity Minutes Units   Ther Ex 40 3       Signature:  Electronically signed by Orly Regalado PTA on 5/26/21 at 10:07 AM EDT

## 2021-05-26 NOTE — PROGRESS NOTES
[place], [address]     Ph: [phone]  Fax: [phone]    [] Certification  [] Recertification []  Plan of Care  [x] Progress Note [] Discharge      To:  Rachael Greene DO      From: Jamarcus Weathers PT   Patient: Antonio Frost     : 1975  Diagnosis: lumbago with sciatica, R side     Date: 2021  Treatment Diagnosis: low back pain with R LE radiating symptoms, impaired lumbar and R hip AROM, impaired core and R hip strength, impaired function       Progress Report Period from:  3/18/2021 to 2021    Total # of Visits to Date: 12   No Show: 1    Canceled Appointment: 0     OBJECTIVE:   Long Term Goals -    Goals Current/ Discharge status Met   Long term goal 1: Pt will demonstrate improved pain level to </=  2/10 to improve QOL. 5-8/10  [] yes  [x] no   Long term goal 2: Pt will demonstrate improved lumbar and hip AROM to [facility] to allow for improved ability to perform daily tasks with decreased pain. AROM RLE (degrees)  RLE General AROM: IR 15, ER 40 seated, unable to assess in supine due to pain     AROM LLE (degrees)  LLE General AROM: IR 32, ER 35  Spine  Lumbar: flexion 75% (c/o increased pain at end range) SB right 25% (increased pain) left 50%, extension WNL   [x] yes  [x] no   Long term goal 3: Pt will demonstrate improve R hip and core strength to >/= 4+/5 and 3/5 respectively to allow for improved ability to perform daily activities with decreased pain. Strength RLE  Comment: SLR 4/5  R Hip Flexion: 4+/5  R Hip Extension: 3-/5  R Hip ABduction: 4-/5  R Hip Internal Rotation: 3+/5  R Hip External Rotation: 4/5  R Knee Flexion: 4+/5  R Knee Extension: 4+/5     Strength Other  Other: abdominals isos fair strength, lower abdominal TVA poor strength [x] yes  [x] no   Long term goal 4: Pt will be indep with HEP to manage symptoms.  independent with current HEP, progressions ongoing  [] yes  [x] no   Long term goal 5: Pt will demonstrate improved RICHARD (Turkmen) <8/50 to demonstrate improved function. 26/50 [] yes  [x] no        Body structures, Functions, Activity limitations: Decreased functional mobility , Decreased ROM, Decreased strength, Increased pain, Decreased sensation  Assessment: patient continues to c/o low back pain with RLE radicular symptoms to foot. States intensity of symptoms decreases after PT sessions. Demonstrates significant muscle guarding and palpable tenderness during assessment of right hip ROM. Would like to continue therapy for further pain relief/ strength to assist with household activities. Goals not met- pt continues with significant pain c/o and requires continued therapy for progress toward goals. PLAN: [] Evaluate and Treat  Frequency/Duration:  Plan  Times per week: 2  Plan weeks: 4-6  Current Treatment Recommendations: Strengthening, Transfer Training, Endurance Training, Neuromuscular Re-education, Patient/Caregiver Education & Training, Equipment Evaluation, Education, & procurement, Manual Therapy - Soft Tissue Mobilization, ROM, Balance Training, Gait Training, Home Exercise Program, Modalities, Positioning, Safety Education & Training, Stair training, Aquatics, Integrated [facility], Functional Mobility Training, Manual Therapy - Joint Manipulation  Plan Comment: Transfer PT POC to Ivis Khan PT                        Patient Status:[] Continue/ Initiate plan of Care    [] Discharge PT. Recommend pt continue with HEP. [x] Additional visits requested, Please re-certify for additional visits: 6-8 additional visits          Signature: Electronically signed by Ganesh Castro PTA on 5/26/21 at 10:08 AM EDT      If you have any questions or concerns, please don't hesitate to call. Thank you for your referral.    I have reviewed this plan of care and certify a need for medically necessary rehabilitation services.     Physician Signature:__________________________________________________________  Date:  Please sign and return

## 2021-05-28 ENCOUNTER — HOSPITAL ENCOUNTER (OUTPATIENT)
Dept: PHYSICAL THERAPY | Age: 46
Setting detail: THERAPIES SERIES
Discharge: HOME | End: 2021-05-28
Payer: COMMERCIAL

## 2021-05-28 PROCEDURE — 97110 THERAPEUTIC EXERCISES: CPT

## 2021-05-28 ASSESSMENT — PAIN DESCRIPTION - ORIENTATION: ORIENTATION: LOWER

## 2021-05-28 ASSESSMENT — PAIN DESCRIPTION - DESCRIPTORS: DESCRIPTORS: ACHING

## 2021-05-28 ASSESSMENT — PAIN DESCRIPTION - LOCATION: LOCATION: BACK

## 2021-05-28 ASSESSMENT — PAIN SCALES - GENERAL: PAINLEVEL_OUTOF10: 4

## 2021-05-28 ASSESSMENT — PAIN DESCRIPTION - FREQUENCY: FREQUENCY: CONTINUOUS

## 2021-05-28 NOTE — PROGRESS NOTES
[address] [address]  Outpatient Physical Therapy    Treatment Note        Date: 2021  Patient: Gricel Samuel  : 1975  ACCT #: [de-identified]  Referring Practitioner: Elba Young DO  Diagnosis: lumbago with sciatica, R side    Visit Information:  PT Visit Information  Onset Date: 21  PT Insurance Information: [employer]  Total # of Visits Approved: 30  Total # of Visits to Date: 13  No Show: 1  Canceled Appointment: 0  Progress Note Counter:     Subjective: Patient without new reports this date. HEP Compliance:  [x] Good [] Fair [] Poor [] Reports not doing due to:    Vital Signs  Patient Currently in Pain: Yes   Pain Screening  Patient Currently in Pain: Yes  Pain Assessment  Pain Assessment: 0-10  Pain Level: 4  Pain Type: Chronic pain  Pain Location: Back  Pain Orientation: Lower  Pain Descriptors: Aching  Pain Frequency: Continuous    OBJECTIVE:   Exercises  Exercise 1: SKTC 30\"x3 B LE, DKTC with Pball w/ towel 3x30\"  Exercise 2: LTR 10\"x10 with Pball  Exercise 5: gentle piriformis stretch 30\"x3 seated  Exercise 6: MIGUEL ANGEL 3 minutes (improving tolerance)  Exercise 7: bridge 3\"x15  Exercise 8: SLR with DLS X 15 ea  Exercise 11: bent knee fall outs x15 YTB  Exercise 12: clams/ reverse clams x10 YTB  Exercise 13: S/L hip ABD x10 b/l  Exercise 20: HEP: S/L hip ABD, bridges (Kyrgyz version)     Modalities:  Modalities  Moist heat: declined due to time     *Indicates exercise, modality, or manual techniques to be initiated when appropriate    Assessment: Body structures, Functions, Activity limitations: Decreased functional mobility , Decreased ROM, Decreased strength, Increased pain, Decreased sensation  Assessment: continued current POC, progressed exercises for core stabilization and bilateral hip strength. Patient continues to fatigue quickly with SLRs. reports decreased pain post stretches.  Continued to trial Prone positioning for decreased radicular symptoms however no change noted. Treatment Diagnosis: low back pain with R LE radiating symptoms, impaired lumbar and R hip AROM, impaired core and R hip strength, impaired function  Goals:  Long term goals  Long term goal 1: Pt will demonstrate improved pain level to </=  2/10 to improve QOL. Long term goal 2: Pt will demonstrate improved lumbar and hip AROM to [facility] to allow for improved ability to perform daily tasks with decreased pain. Long term goal 3: Pt will demonstrate improve R hip and core strength to >/= 4+/5 and 3/5 respectively to allow for improved ability to perform daily activities with decreased pain. Long term goal 4: Pt will be indep with HEP to manage symptoms. Long term goal 5: Pt will demonstrate improved RICHARD (Telugu) <8/50 to demonstrate improved function. Progress toward goals: continue towards all     POST-PAIN       Pain Rating (0-10 pain scale):  2-3 /10   Location and pain description same as pre-treatment unless indicated. Action: [] NA   [x] Perform HEP  [] Meds as prescribed  [] Modalities as prescribed   [] Call Physician     Frequency/Duration:  Plan  Times per week: 2  Plan weeks: 4-6  Current Treatment Recommendations: Strengthening, Transfer Training, Endurance Training, Neuromuscular Re-education, Patient/Caregiver Education & Training, Equipment Evaluation, Education, & procurement, Manual Therapy - Soft Tissue Mobilization, ROM, Balance Training, Gait Training, Home Exercise Program, Modalities, Positioning, Safety Education & Training, Stair training, Aquatics, Integrated [employer], Functional Mobility Training, Manual Therapy - Joint Manipulation  Plan Comment: Transfer PT POC to Erick Claros, PT     Pt to continue current HEP. See objective section for any therapeutic exercise changes, additions or modifications this date.     PT Individual Minutes  Time In: 1340  Time Out: 6797  Minutes: 40  Timed Code Treatment Minutes: 40 Minutes  Procedure Minutes:0   Timed Activity Minutes Units Ther Ex 40 3       Signature:  Electronically signed by Rai Nevarez PTA on 5/28/21 at 3:22 PM EDT

## 2021-06-08 ENCOUNTER — HOSPITAL ENCOUNTER (OUTPATIENT)
Dept: PHYSICAL THERAPY | Age: 46
Setting detail: THERAPIES SERIES
Discharge: HOME | End: 2021-06-08
Payer: COMMERCIAL

## 2021-06-08 PROCEDURE — 97140 MANUAL THERAPY 1/> REGIONS: CPT

## 2021-06-08 PROCEDURE — 97110 THERAPEUTIC EXERCISES: CPT

## 2021-06-08 ASSESSMENT — PAIN DESCRIPTION - ORIENTATION: ORIENTATION: RIGHT;LOWER

## 2021-06-08 ASSESSMENT — PAIN DESCRIPTION - DESCRIPTORS: DESCRIPTORS: ACHING

## 2021-06-08 ASSESSMENT — PAIN DESCRIPTION - LOCATION: LOCATION: BACK

## 2021-06-08 ASSESSMENT — PAIN DESCRIPTION - PAIN TYPE: TYPE: CHRONIC PAIN

## 2021-06-08 ASSESSMENT — PAIN SCALES - GENERAL: PAINLEVEL_OUTOF10: 4

## 2021-06-08 NOTE — PROGRESS NOTES
pre-treatment unless indicated. Action: [] NA   [x] Perform HEP  [] Meds as prescribed  [x] Modalities as prescribed   [] Call Physician     Frequency/Duration:  Plan  Times per week: 2  Plan weeks: 4-6  Current Treatment Recommendations: Strengthening, Transfer Training, Endurance Training, Neuromuscular Re-education, Patient/Caregiver Education & Training, Equipment Evaluation, Education, & procurement, Manual Therapy - Soft Tissue Mobilization, ROM, Balance Training, Gait Training, Home Exercise Program, Modalities, Positioning, Safety Education & Training, Stair training, Aquatics, Integrated [employer], Functional Mobility Training, Manual Therapy - Joint Manipulation  Plan Comment: Transfer PT POC to [facility], PT     Pt to continue current HEP. See objective section for any therapeutic exercise changes, additions or modifications this date.     PT Individual Minutes  Time In: 3409  Time Out: 3139  Minutes: 48  Timed Code Treatment Minutes: 38 Minutes  Procedure Minutes: 10 min (ES/MH)      Timed Activity Minutes Units   Ther Ex 33 2   Manual  5 1     Signature:  Electronically signed by George Oliver PTA on 6/8/21 at 12:05 PM EDT

## 2021-06-11 ENCOUNTER — HOSPITAL ENCOUNTER (EMERGENCY)
Age: 46
Discharge: HOME | End: 2021-06-11
Attending: EMERGENCY MEDICINE
Payer: COMMERCIAL

## 2021-06-11 VITALS
HEART RATE: 81 BPM | BODY MASS INDEX: 41.78 KG/M2 | OXYGEN SATURATION: 100 % | HEIGHT: 66 IN | DIASTOLIC BLOOD PRESSURE: 78 MMHG | SYSTOLIC BLOOD PRESSURE: 120 MMHG | RESPIRATION RATE: 16 BRPM | TEMPERATURE: 98.3 F | WEIGHT: 260 LBS

## 2021-06-11 DIAGNOSIS — M54.31 SCIATICA OF RIGHT SIDE: Primary | ICD-10-CM

## 2021-06-11 PROCEDURE — 6360000002 HC RX W HCPCS: Performed by: EMERGENCY MEDICINE

## 2021-06-11 PROCEDURE — 96372 THER/PROPH/DIAG INJ SC/IM: CPT

## 2021-06-11 PROCEDURE — 6370000000 HC RX 637 (ALT 250 FOR IP): Performed by: EMERGENCY MEDICINE

## 2021-06-11 PROCEDURE — 99283 EMERGENCY DEPT VISIT LOW MDM: CPT

## 2021-06-11 RX ORDER — HYDROCODONE BITARTRATE AND ACETAMINOPHEN 5; 325 MG/1; MG/1
1 TABLET ORAL EVERY 6 HOURS PRN
Qty: 12 TABLET | Refills: 0 | Status: SHIPPED | OUTPATIENT
Start: 2021-06-11 | End: 2021-06-14

## 2021-06-11 RX ORDER — CYCLOBENZAPRINE HCL 10 MG
10 TABLET ORAL ONCE
Status: COMPLETED | OUTPATIENT
Start: 2021-06-11 | End: 2021-06-11

## 2021-06-11 RX ORDER — KETOROLAC TROMETHAMINE 30 MG/ML
60 INJECTION, SOLUTION INTRAMUSCULAR; INTRAVENOUS ONCE
Status: COMPLETED | OUTPATIENT
Start: 2021-06-11 | End: 2021-06-11

## 2021-06-11 RX ORDER — CYCLOBENZAPRINE HCL 10 MG
10 TABLET ORAL 3 TIMES DAILY PRN
Qty: 21 TABLET | Refills: 0 | Status: SHIPPED | OUTPATIENT
Start: 2021-06-11 | End: 2021-06-21

## 2021-06-11 RX ADMIN — CYCLOBENZAPRINE 10 MG: 10 TABLET, FILM COATED ORAL at 04:31

## 2021-06-11 RX ADMIN — KETOROLAC TROMETHAMINE 60 MG: 30 INJECTION, SOLUTION INTRAMUSCULAR at 04:31

## 2021-06-11 ASSESSMENT — ENCOUNTER SYMPTOMS
ABDOMINAL PAIN: 0
SORE THROAT: 0
PHOTOPHOBIA: 0
SHORTNESS OF BREATH: 0
VOMITING: 0
ABDOMINAL DISTENTION: 0
CHEST TIGHTNESS: 0
EYE DISCHARGE: 0
WHEEZING: 0
COUGH: 0

## 2021-06-11 ASSESSMENT — PAIN SCALES - GENERAL
PAINLEVEL_OUTOF10: 8
PAINLEVEL_OUTOF10: 8

## 2021-06-11 ASSESSMENT — PAIN DESCRIPTION - LOCATION: LOCATION: BACK

## 2021-06-11 ASSESSMENT — PAIN DESCRIPTION - PAIN TYPE: TYPE: CHRONIC PAIN

## 2021-06-11 NOTE — ED PROVIDER NOTES
[address] [facility] ED  eMERGENCY dEPARTMENT eNCOUnter      Pt Name: Tommy Camacho  MRN: [id number]  [place] 1975  Date of evaluation: 6/11/2021  Provider: Summer Meyers MD    CHIEF COMPLAINT       Chief Complaint   Patient presents with    Back Pain         HISTORY OF PRESENT ILLNESS   (Location/Symptom, Timing/Onset,Context/Setting, Quality, Duration, Modifying Factors, Severity)  Note limiting factors. Tommy Camacho is a 54 y.o. female who presents to the emergency department for evaluation of lower back pain. Patient has history of intermittent back problems and sciatica. She states she has been dealing with pain in their lower back right-sided down her right leg for the past couple weeks. It was worse tonight so she comes in for evaluation. She does not currently see a back specialist.  She denies incontinence. She denies numbness or paresthesias. Pain is worse with activity. HPI    NursingNotes were reviewed. REVIEW OF SYSTEMS    (2-9 systems for level 4, 10 or more for level 5)     Review of Systems   Constitutional: Negative for chills and diaphoresis. HENT: Negative for congestion, ear pain, mouth sores and sore throat. Eyes: Negative for photophobia and discharge. Respiratory: Negative for cough, chest tightness, shortness of breath and wheezing. Cardiovascular: Negative for chest pain and palpitations. Gastrointestinal: Negative for abdominal distention, abdominal pain and vomiting. Endocrine: Negative for cold intolerance. Genitourinary: Negative for difficulty urinating and flank pain. Musculoskeletal: Negative for arthralgias. Skin: Negative for pallor and rash. Allergic/Immunologic: Negative for immunocompromised state. Neurological: Negative for dizziness, syncope and numbness. Hematological: Negative for adenopathy. Psychiatric/Behavioral: Negative for agitation and hallucinations.    All other systems reviewed and are negative. Except as noted above the remainder of the review of systems was reviewed and negative. PAST MEDICAL HISTORY     Past Medical History:   Diagnosis Date    Diabetes mellitus ([address]          SURGICALHISTORY       Past Surgical History:   Procedure Laterality Date    HERNIA REPAIR           CURRENT MEDICATIONS       Previous Medications    LIDOCAINE VISCOUS HCL (XYLOCAINE) 2 % SOLN SOLUTION    Take 5 mLs by mouth as needed for Irritation or Dental Pain    NAPROXEN (NAPROSYN) 500 MG TABLET    Take 1 tablet by mouth 2 times daily for 20 doses    PREDNISONE (DELTASONE) 10 MG TABLET    6 tablets by mouth on day 1 and decrease by 1 tablet daily until gone       ALLERGIES     Shrimp (diagnostic)    FAMILY HISTORY       Family History   Problem Relation Age of Onset    Breast Cancer Maternal Aunt           SOCIAL HISTORY       Social History     Socioeconomic History    Marital status: Single     Spouse name: None    Number of children: None    Years of education: None    Highest education level: None   Occupational History    None   Tobacco Use    Smoking status: Current Every Day Smoker     Types: Cigarettes    Smokeless tobacco: Never Used   Vaping Use    Vaping Use: Never used   Substance and Sexual Activity    Alcohol use: No    Drug use: No    Sexual activity: None   Other Topics Concern    None   Social History Narrative    None     Social Determinants of Health     Financial Resource Strain:     Difficulty of Paying Living Expenses:    Food Insecurity:     Worried About Running Out of Food in the Last Year:     Ran Out of Food in the Last Year:    Transportation Needs:     Lack of Transportation (Medical):      Lack of Transportation (Non-Medical):    Physical Activity:     Days of Exercise per Week:     Minutes of Exercise per Session:    Stress:     Feeling of Stress :    Social Connections:     Frequency of Communication with Friends and Family:     Frequency of Social Gatherings with Friends and Family:     Attends Jew Services:     Active Member of Clubs or Organizations:     Attends Club or Organization Meetings:     Marital Status:    Intimate Partner Violence:     Fear of Current or Ex-Partner:     Emotionally Abused:     Physically Abused:     Sexually Abused:        SCREENINGS      @FLOW([id number])@      PHYSICAL EXAM    (up to 7 for level 4, 8 or more for level 5)     ED Triage Vitals   BP Temp Temp Source Pulse Resp SpO2 Height Weight   06/11/21 0237 06/11/21 0237 06/11/21 0237 06/11/21 0237 06/11/21 0237 06/11/21 0237 06/11/21 0412 06/11/21 0412   120/78 98.3 °F (36.8 °C) Oral 81 16 100 % 5' 6\" (1.676 m) 260 lb (117.9 kg)       Physical Exam  Vitals and nursing note reviewed. Constitutional:       Appearance: She is well-developed. HENT:      Head: Normocephalic. Nose: Nose normal.      Mouth/Throat:      Mouth: Mucous membranes are moist.   Eyes:      Conjunctiva/sclera: Conjunctivae normal.      Pupils: Pupils are equal, round, and reactive to light. Cardiovascular:      Rate and Rhythm: Normal rate and regular rhythm. Heart sounds: Normal heart sounds. Pulmonary:      Effort: Pulmonary effort is normal.      Breath sounds: Normal breath sounds. Abdominal:      General: Bowel sounds are normal.      Palpations: Abdomen is soft. Tenderness: There is no abdominal tenderness. There is no guarding. Musculoskeletal:         General: Normal range of motion. Cervical back: Normal range of motion and neck supple. Lumbar back: Spasms and tenderness present. No swelling or deformity. Normal range of motion. Back:    Skin:     General: Skin is warm and dry. Capillary Refill: Capillary refill takes less than 2 seconds. Neurological:      Mental Status: She is alert and oriented to person, place, and time.          DIAGNOSTIC RESULTS     EKG: All EKG's are interpreted by the Emergency Department Physician who either signs or Co-signsthis chart in the absence of a cardiologist.      RADIOLOGY:   Kari Trivedi such as CT, Ultrasound and MRI are read by the radiologist. Plain radiographic images are visualized and preliminarily interpreted by the emergency physician with the below findings:      Interpretation per the Radiologist below, if available at the time ofthis note:    No orders to display         ED BEDSIDE ULTRASOUND:   Performed by ED Physician - none    LABS:  Labs Reviewed - No data to display    All other labs were within normal range or not returned as of this dictation. EMERGENCY DEPARTMENT COURSE and DIFFERENTIAL DIAGNOSIS/MDM:   Vitals:    Vitals:    06/11/21 0237 06/11/21 0412   BP: 120/78    Pulse: 81    Resp: 16    Temp: 98.3 °F (36.8 °C)    TempSrc: Oral    SpO2: 100%    Weight:  260 lb (117.9 kg)   Height:  5' 6\" (1.676 m)          MDM patient has symptoms and exam consistent with that of sciatica. She is referred to neurosurgery for follow-up given her recurring problems. Review of the O[facility] report is good so she is given limited pain medication and muscle relaxer for home      CONSULTS:  None    PROCEDURES:  Unless otherwise noted below, none     Procedures    FINAL IMPRESSION      1. Sciatica of right side          DISPOSITION/PLAN   DISPOSITION Decision To Discharge 06/11/2021 04:21:55 AM      PATIENT REFERRED TO:  Jonathon Abarca DO  [address] [id number] [phone]    In 3 days      MD Padmini Oorzco [phone]  [phone]    In 1 week        DISCHARGE MEDICATIONS:  New Prescriptions    CYCLOBENZAPRINE (FLEXERIL) 10 MG TABLET    Take 1 tablet by mouth 3 times daily as needed for Muscle spasms    HYDROCODONE-ACETAMINOPHEN (NORCO) 5-325 MG PER TABLET    Take 1 tablet by mouth every 6 hours as needed for Pain for up to 3 days.           (Please note that portions of this note were completed with a voice recognition program.  Efforts were made

## 2021-06-11 NOTE — ED TRIAGE NOTES
Pt reports chronic back pain, increasing tonight. Pt reports she can't sleep. Pt awake and alert. No s/s of distress noted at this time. No concerns or needs at this time. Will continue to monitor.

## 2021-06-26 ENCOUNTER — HOSPITAL ENCOUNTER (EMERGENCY)
Age: 46
Discharge: HOME | End: 2021-06-26
Attending: FAMILY MEDICINE
Payer: COMMERCIAL

## 2021-06-26 ENCOUNTER — APPOINTMENT (OUTPATIENT)
Dept: CT IMAGING | Age: 46
End: 2021-06-26
Payer: COMMERCIAL

## 2021-06-26 VITALS
OXYGEN SATURATION: 97 % | HEART RATE: 81 BPM | SYSTOLIC BLOOD PRESSURE: 114 MMHG | DIASTOLIC BLOOD PRESSURE: 75 MMHG | BODY MASS INDEX: 41.78 KG/M2 | HEIGHT: 66 IN | RESPIRATION RATE: 17 BRPM | TEMPERATURE: 98 F | WEIGHT: 260 LBS

## 2021-06-26 DIAGNOSIS — N30.01 ACUTE CYSTITIS WITH HEMATURIA: Primary | ICD-10-CM

## 2021-06-26 LAB
ALBUMIN SERPL-MCNC: 3.7 G/DL (ref 3.5–4.6)
ALP BLD-CCNC: 47 U/L (ref 40–130)
ALT SERPL-CCNC: 8 U/L (ref 0–33)
ANION GAP SERPL CALCULATED.3IONS-SCNC: 10 MEQ/L (ref 9–15)
AST SERPL-CCNC: 11 U/L (ref 0–35)
BACTERIA: NEGATIVE /HPF
BASOPHILS ABSOLUTE: 0.1 K/UL (ref 0–0.2)
BASOPHILS RELATIVE PERCENT: 0.5 %
BILIRUB SERPL-MCNC: <0.2 MG/DL (ref 0.2–0.7)
BILIRUBIN URINE: NEGATIVE
BLOOD, URINE: ABNORMAL
BUN BLDV-MCNC: 6 MG/DL (ref 6–20)
CALCIUM SERPL-MCNC: 9.4 MG/DL (ref 8.5–9.9)
CHLORIDE BLD-SCNC: 104 MEQ/L (ref 95–107)
CLARITY: CLEAR
CO2: 24 MEQ/L (ref 20–31)
COLOR: YELLOW
CREAT SERPL-MCNC: 0.65 MG/DL (ref 0.5–0.9)
EOSINOPHILS ABSOLUTE: 0.1 K/UL (ref 0–0.7)
EOSINOPHILS RELATIVE PERCENT: 0.7 %
EPITHELIAL CELLS, UA: ABNORMAL /HPF (ref 0–5)
GFR AFRICAN AMERICAN: >60
GFR NON-AFRICAN AMERICAN: >60
GLOBULIN: 3.2 G/DL (ref 2.3–3.5)
GLUCOSE BLD-MCNC: 106 MG/DL (ref 70–99)
GLUCOSE URINE: NEGATIVE MG/DL
GONADOTROPIN, CHORIONIC (HCG) QUANT: <0.1 MIU/ML
HCG, URINE, POC: NEGATIVE
HCT VFR BLD CALC: 36.4 % (ref 37–47)
HEMOGLOBIN: 11.9 G/DL (ref 12–16)
HYALINE CASTS: ABNORMAL /HPF (ref 0–5)
KETONES, URINE: NEGATIVE MG/DL
LEUKOCYTE ESTERASE, URINE: NEGATIVE
LYMPHOCYTES ABSOLUTE: 1.8 K/UL (ref 1–4.8)
LYMPHOCYTES RELATIVE PERCENT: 18.7 %
Lab: NORMAL
MCH RBC QN AUTO: 27.3 PG (ref 27–31.3)
MCHC RBC AUTO-ENTMCNC: 32.7 % (ref 33–37)
MCV RBC AUTO: 83.4 FL (ref 82–100)
MONOCYTES ABSOLUTE: 0.4 K/UL (ref 0.2–0.8)
MONOCYTES RELATIVE PERCENT: 4.5 %
NEGATIVE QC PASS/FAIL: NORMAL
NEUTROPHILS ABSOLUTE: 7.3 K/UL (ref 1.4–6.5)
NEUTROPHILS RELATIVE PERCENT: 75.6 %
NITRITE, URINE: NEGATIVE
PDW BLD-RTO: 14.8 % (ref 11.5–14.5)
PH UA: 5.5 (ref 5–9)
PLATELET # BLD: 298 K/UL (ref 130–400)
POSITIVE QC PASS/FAIL: NORMAL
POTASSIUM REFLEX MAGNESIUM: 3.9 MEQ/L (ref 3.4–4.9)
PROTEIN UA: NEGATIVE MG/DL
RBC # BLD: 4.36 M/UL (ref 4.2–5.4)
RBC UA: ABNORMAL /HPF (ref 0–5)
SODIUM BLD-SCNC: 138 MEQ/L (ref 135–144)
SPECIFIC GRAVITY UA: 1.02 (ref 1–1.03)
TOTAL PROTEIN: 6.9 G/DL (ref 6.3–8)
UROBILINOGEN, URINE: 0.2 E.U./DL
WBC # BLD: 9.7 K/UL (ref 4.8–10.8)
WBC UA: ABNORMAL /HPF (ref 0–5)

## 2021-06-26 PROCEDURE — 99284 EMERGENCY DEPT VISIT MOD MDM: CPT

## 2021-06-26 PROCEDURE — 85025 COMPLETE CBC W/AUTO DIFF WBC: CPT

## 2021-06-26 PROCEDURE — 87086 URINE CULTURE/COLONY COUNT: CPT

## 2021-06-26 PROCEDURE — 84702 CHORIONIC GONADOTROPIN TEST: CPT

## 2021-06-26 PROCEDURE — 81001 URINALYSIS AUTO W/SCOPE: CPT

## 2021-06-26 PROCEDURE — 6370000000 HC RX 637 (ALT 250 FOR IP): Performed by: FAMILY MEDICINE

## 2021-06-26 PROCEDURE — 74176 CT ABD & PELVIS W/O CONTRAST: CPT

## 2021-06-26 PROCEDURE — 36415 COLL VENOUS BLD VENIPUNCTURE: CPT

## 2021-06-26 PROCEDURE — 80053 COMPREHEN METABOLIC PANEL: CPT

## 2021-06-26 RX ORDER — KETOROLAC TROMETHAMINE 30 MG/ML
30 INJECTION, SOLUTION INTRAMUSCULAR; INTRAVENOUS ONCE
Status: DISCONTINUED | OUTPATIENT
Start: 2021-06-26 | End: 2021-06-26

## 2021-06-26 RX ORDER — CIPROFLOXACIN 500 MG/1
500 TABLET, FILM COATED ORAL ONCE
Status: COMPLETED | OUTPATIENT
Start: 2021-06-26 | End: 2021-06-26

## 2021-06-26 RX ORDER — CIPROFLOXACIN 500 MG/1
500 TABLET, FILM COATED ORAL 2 TIMES DAILY
Qty: 10 TABLET | Refills: 0 | Status: SHIPPED | OUTPATIENT
Start: 2021-06-26 | End: 2021-06-26 | Stop reason: SDUPTHER

## 2021-06-26 RX ORDER — IBUPROFEN 800 MG/1
800 TABLET ORAL ONCE
Status: COMPLETED | OUTPATIENT
Start: 2021-06-26 | End: 2021-06-26

## 2021-06-26 RX ORDER — CIPROFLOXACIN 500 MG/1
500 TABLET, FILM COATED ORAL 2 TIMES DAILY
Qty: 10 TABLET | Refills: 0 | Status: SHIPPED | OUTPATIENT
Start: 2021-06-26 | End: 2021-07-01

## 2021-06-26 RX ADMIN — IBUPROFEN 800 MG: 800 TABLET, FILM COATED ORAL at 12:48

## 2021-06-26 RX ADMIN — CIPROFLOXACIN 500 MG: 500 TABLET, FILM COATED ORAL at 12:44

## 2021-06-26 ASSESSMENT — ENCOUNTER SYMPTOMS
HEMATOCHEZIA: 0
NAUSEA: 0
VOMITING: 0
ABDOMINAL PAIN: 1
ALLERGIC/IMMUNOLOGIC NEGATIVE: 1
BELCHING: 0
DIARRHEA: 0
CONSTIPATION: 0
RESPIRATORY NEGATIVE: 1
HEMATEMESIS: 0
SHORTNESS OF BREATH: 0
COUGH: 0
SORE THROAT: 0
FLATUS: 0
EYES NEGATIVE: 1

## 2021-06-26 ASSESSMENT — PAIN DESCRIPTION - INTENSITY: RATING_2: 6

## 2021-06-26 ASSESSMENT — PAIN SCALES - GENERAL: PAINLEVEL_OUTOF10: 6

## 2021-06-26 ASSESSMENT — PAIN DESCRIPTION - ORIENTATION
ORIENTATION: LOWER
ORIENTATION_2: LEFT

## 2021-06-26 ASSESSMENT — PAIN DESCRIPTION - DESCRIPTORS: DESCRIPTORS: CRAMPING

## 2021-06-26 ASSESSMENT — PAIN DESCRIPTION - LOCATION
LOCATION: ABDOMEN
LOCATION_2: FACE

## 2021-06-26 NOTE — ED PROVIDER NOTES
[address] [facility] ED  eMERGENCY dEPARTMENT eNCOUnter      Pt Name: Cheryl Alberto  MRN: [id number]  [place] 1975  Date of evaluation: 6/26/2021  Provider: Wesly Henson MD    CHIEF COMPLAINT       Chief Complaint   Patient presents with    Abdominal Pain     lower abd pain since this morning. denies nvd    Facial Pain     pain on left side of face since this morning. no mechanism of injury. denies dental pain          HISTORY OF PRESENT ILLNESS   (Location/Symptom, Timing/Onset,Context/Setting, Quality, Duration, Modifying Factors, Severity)  Note limiting factors. Cheryl Alberto is a 54 y.o. female who presents to the emergency department     The history is provided by the patient. Abdominal Pain  Pain location:  Suprapubic  Pain quality: aching and cramping    Pain radiates to:  Does not radiate  Pain severity:  Moderate  Onset quality:  Gradual  Duration:  1 day  Timing:  Constant  Progression:  Unchanged  Chronicity:  New  Context: not alcohol use, not awakening from sleep, not diet changes, not eating, not laxative use, not medication withdrawal, not previous surgeries, not recent illness, not recent sexual activity, not recent travel, not retching, not sick contacts, not suspicious food intake and not trauma    Relieved by:  Nothing  Worsened by:  Nothing  Ineffective treatments:  None tried  Associated symptoms: no belching, no chest pain, no chills, no constipation, no cough, no diarrhea, no dysuria, no fatigue, no fever, no flatus, no hematemesis, no hematochezia, no hematuria, no nausea, no shortness of breath, no sore throat, no vaginal bleeding, no vaginal discharge and no vomiting        NursingNotes were reviewed. REVIEW OF SYSTEMS    (2-9 systems for level 4, 10 or more for level 5)     Review of Systems   Constitutional: Negative. Negative for chills, fatigue and fever. HENT: Negative. Negative for sore throat. Eyes: Negative. Respiratory: Negative. Used   Vaping Use    Vaping Use: Never used   Substance and Sexual Activity    Alcohol use: No    Drug use: No    Sexual activity: None   Other Topics Concern    None   Social History Narrative    None     Social Determinants of Health     Financial Resource Strain:     Difficulty of Paying Living Expenses:    Food Insecurity:     Worried About Running Out of Food in the Last Year:     [address] in the Last Year:    Transportation Needs:     Lack of Transportation (Medical):  Lack of Transportation (Non-Medical):    Physical Activity:     Days of Exercise per Week:     Minutes of Exercise per Session:    Stress:     Feeling of Stress :    Social Connections:     Frequency of Communication with Friends and Family:     Frequency of Social Gatherings with Friends and Family:     Attends Spiritism Services:     Active Member of Clubs or Organizations:     Attends Club or Organization Meetings:     Marital Status:    Intimate Partner Violence:     Fear of Current or Ex-Partner:     Emotionally Abused:     Physically Abused:     Sexually Abused:        SCREENINGS      @FLOW([id number])@      PHYSICAL EXAM    (up to 7 for level 4, 8 or more for level 5)     ED Triage Vitals [06/26/21 1039]   BP Temp Temp Source Pulse Resp SpO2 Height Weight   114/75 98 °F (36.7 °C) Temporal 81 17 97 % 5' 6\" (1.676 m) 260 lb (117.9 kg)       Physical Exam  Vitals and nursing note reviewed. Constitutional:       Appearance: She is well-developed. HENT:      Head: Normocephalic and atraumatic. Right Ear: External ear normal.      Left Ear: External ear normal.      Nose: Nose normal.   Eyes:      Pupils: Pupils are equal, round, and reactive to light. Cardiovascular:      Rate and Rhythm: Normal rate and regular rhythm. Heart sounds: Normal heart sounds. Pulmonary:      Effort: Pulmonary effort is normal. No respiratory distress. Breath sounds: Normal breath sounds. No wheezing or rales. Chest:      Chest wall: No tenderness. Abdominal:      General: Abdomen is flat. Bowel sounds are normal.      Palpations: Abdomen is soft. Tenderness: There is abdominal tenderness in the suprapubic area. Musculoskeletal:         General: Normal range of motion. Cervical back: Normal range of motion and neck supple. Skin:     General: Skin is warm and dry. Neurological:      Mental Status: She is alert and oriented to person, place, and time. Cranial Nerves: No cranial nerve deficit. Sensory: No sensory deficit. Motor: No abnormal muscle tone. Coordination: Coordination normal.      Deep Tendon Reflexes: Reflexes normal.   Psychiatric:         Behavior: Behavior normal.         Thought Content: Thought content normal.         Judgment: Judgment normal.         DIAGNOSTIC RESULTS     EKG: All EKG's are interpreted by the Emergency Department Physician who either signs or Co-signsthis chart in the absence of a cardiologist.       RADIOLOGY:   Demaris Hones such as CT, Ultrasound and MRI are read by the radiologist. Plain radiographic images are visualized and preliminarily interpreted by the emergency physician with the below findings:         Interpretation per the Radiologist below, if available at the time ofthis note:    CT ABDOMEN PELVIS WO CONTRAST Additional Contrast? None   Final Result      No acute process in the abdomen/pelvis.  No significant interval change.            ==========                     ED BEDSIDE ULTRASOUND:   Performed by ED Physician - none    LABS:  Labs Reviewed   URINALYSIS - Abnormal; Notable for the following components:       Result Value    Blood, Urine TRACE (*)     All other components within normal limits   COMPREHENSIVE METABOLIC PANEL W/ REFLEX TO MG FOR LOW K - Abnormal; Notable for the following components:    Glucose 106 (*)     All other components within normal limits   CBC WITH AUTO DIFFERENTIAL - Abnormal; Notable for the following components:    Hemoglobin 11.9 (*)     Hematocrit 36.4 (*)     MCHC 32.7 (*)     RDW 14.8 (*)     Neutrophils Absolute 7.3 (*)     All other components within normal limits   MICROSCOPIC URINALYSIS - Abnormal; Notable for the following components:    RBC, UA 3-5 (*)     All other components within normal limits   CULTURE, URINE   HCG, QUANTITATIVE, PREGNANCY   POC PREGNANCY UR-QUAL       All other labs were within normal range or not returned as of this dictation. EMERGENCY DEPARTMENT COURSE and DIFFERENTIAL DIAGNOSIS/MDM:   Vitals:    Vitals:    06/26/21 1039   BP: 114/75   Pulse: 81   Resp: 17   Temp: 98 °F (36.7 °C)   TempSrc: Temporal   SpO2: 97%   Weight: 260 lb (117.9 kg)   Height: 5' 6\" (1.676 m)                 MDM     CONSULTS:  None    PROCEDURES:  Unless otherwise noted below, none     Procedures    FINAL IMPRESSION      1. Acute cystitis with hematuria          DISPOSITION/PLAN   DISPOSITION Decision To Discharge 06/26/2021 12:44:15 PM      PATIENT REFERRED TO:  No follow-up provider specified.     DISCHARGE MEDICATIONS:  Discharge Medication List as of 6/26/2021 12:44 PM      START taking these medications    Details   ciprofloxacin (CIPRO) 500 MG tablet Take 1 tablet by mouth 2 times daily for 5 days, Disp-10 tablet, R-0Normal                (Please note thatportions of this note were completed with a voice recognition program.  Efforts were made to edit the dictations but occasionally words are mis-transcribed.)    Regina Lange MD (electronically signed)  Attending Emergency Physician          Bree Stone MD  06/26/21 [phone]

## 2021-06-26 NOTE — ED TRIAGE NOTES
Patient presents with complaints of bilateral lower abdominal pain x 2 days, denies urinary symptoms or n/v/d. No distress noted on arrival. Skin warm and dry. Respirations equal and unlabored.

## 2021-06-28 LAB — URINE CULTURE, ROUTINE: NORMAL

## 2021-07-22 ENCOUNTER — APPOINTMENT (OUTPATIENT)
Dept: GENERAL RADIOLOGY | Age: 46
End: 2021-07-22
Payer: COMMERCIAL

## 2021-07-22 ENCOUNTER — HOSPITAL ENCOUNTER (EMERGENCY)
Age: 46
Discharge: HOME | End: 2021-07-22
Payer: COMMERCIAL

## 2021-07-22 VITALS
HEIGHT: 68 IN | TEMPERATURE: 98.3 F | BODY MASS INDEX: 37.89 KG/M2 | RESPIRATION RATE: 19 BRPM | WEIGHT: 250 LBS | OXYGEN SATURATION: 100 % | HEART RATE: 70 BPM | SYSTOLIC BLOOD PRESSURE: 119 MMHG | DIASTOLIC BLOOD PRESSURE: 47 MMHG

## 2021-07-22 DIAGNOSIS — S39.012A STRAIN OF LUMBAR REGION, INITIAL ENCOUNTER: Primary | ICD-10-CM

## 2021-07-22 PROCEDURE — 72110 X-RAY EXAM L-2 SPINE 4/>VWS: CPT

## 2021-07-22 PROCEDURE — 96372 THER/PROPH/DIAG INJ SC/IM: CPT

## 2021-07-22 PROCEDURE — 6360000002 HC RX W HCPCS: Performed by: NURSE PRACTITIONER

## 2021-07-22 PROCEDURE — 99283 EMERGENCY DEPT VISIT LOW MDM: CPT

## 2021-07-22 RX ORDER — KETOROLAC TROMETHAMINE 30 MG/ML
60 INJECTION, SOLUTION INTRAMUSCULAR; INTRAVENOUS ONCE
Status: COMPLETED | OUTPATIENT
Start: 2021-07-22 | End: 2021-07-22

## 2021-07-22 RX ORDER — METHYLPREDNISOLONE ACETATE 80 MG/ML
80 INJECTION, SUSPENSION INTRA-ARTICULAR; INTRALESIONAL; INTRAMUSCULAR; SOFT TISSUE ONCE
Status: COMPLETED | OUTPATIENT
Start: 2021-07-22 | End: 2021-07-22

## 2021-07-22 RX ORDER — METHYLPREDNISOLONE 4 MG/1
TABLET ORAL
Qty: 1 KIT | Refills: 0 | Status: SHIPPED | OUTPATIENT
Start: 2021-07-22 | End: 2021-07-28

## 2021-07-22 RX ORDER — KETOROLAC TROMETHAMINE 10 MG/1
10 TABLET, FILM COATED ORAL EVERY 6 HOURS PRN
Qty: 20 TABLET | Refills: 0 | Status: SHIPPED | OUTPATIENT
Start: 2021-07-22 | End: 2021-07-27

## 2021-07-22 RX ORDER — HYDROCODONE BITARTRATE AND ACETAMINOPHEN 5; 325 MG/1; MG/1
1 TABLET ORAL ONCE
Status: DISCONTINUED | OUTPATIENT
Start: 2021-07-22 | End: 2021-07-22 | Stop reason: HOSPADM

## 2021-07-22 RX ADMIN — KETOROLAC TROMETHAMINE 60 MG: 30 INJECTION, SOLUTION INTRAMUSCULAR at 08:55

## 2021-07-22 RX ADMIN — METHYLPREDNISOLONE ACETATE 80 MG: 80 INJECTION, SUSPENSION INTRA-ARTICULAR; INTRALESIONAL; INTRAMUSCULAR; SOFT TISSUE at 08:56

## 2021-07-22 ASSESSMENT — ENCOUNTER SYMPTOMS
BACK PAIN: 1
COLOR CHANGE: 0
TROUBLE SWALLOWING: 0
ABDOMINAL PAIN: 0
NAUSEA: 0
BLOOD IN STOOL: 0
EYE REDNESS: 0
RHINORRHEA: 0
EYE DISCHARGE: 0
WHEEZING: 0
SHORTNESS OF BREATH: 0
VOMITING: 0
EYE PAIN: 0
SORE THROAT: 0
CONSTIPATION: 0
COUGH: 0
DIARRHEA: 0

## 2021-07-22 ASSESSMENT — PAIN SCALES - GENERAL
PAINLEVEL_OUTOF10: 5
PAINLEVEL_OUTOF10: 7
PAINLEVEL_OUTOF10: 7

## 2021-07-22 ASSESSMENT — PAIN DESCRIPTION - ONSET
ONSET: ON-GOING
ONSET: ON-GOING

## 2021-07-22 ASSESSMENT — PAIN DESCRIPTION - ORIENTATION
ORIENTATION: RIGHT;LOWER
ORIENTATION: RIGHT;LOWER

## 2021-07-22 ASSESSMENT — PAIN DESCRIPTION - PAIN TYPE
TYPE: ACUTE PAIN
TYPE: ACUTE PAIN

## 2021-07-22 ASSESSMENT — PAIN DESCRIPTION - PROGRESSION: CLINICAL_PROGRESSION: GRADUALLY IMPROVING

## 2021-07-22 ASSESSMENT — PAIN DESCRIPTION - FREQUENCY
FREQUENCY: CONTINUOUS
FREQUENCY: CONTINUOUS

## 2021-07-22 ASSESSMENT — PAIN DESCRIPTION - LOCATION
LOCATION: ARM
LOCATION: BACK

## 2021-07-22 ASSESSMENT — PAIN DESCRIPTION - DESCRIPTORS
DESCRIPTORS: ACHING
DESCRIPTORS: ACHING

## 2021-07-22 NOTE — ED PROVIDER NOTES
[facility] ED  EMERGENCY DEPARTMENT ENCOUNTER      Pt Name: Crissy Mcfarland  MRN: [id number]  [place] 1975  Date of evaluation: 7/22/2021  Provider: CHAPINCITO Franco CNP    CHIEF COMPLAINT       Chief Complaint   Patient presents with    Back Pain     right lower side, started yesterday         HISTORY OF PRESENT ILLNESS   (Location/Symptom, Timing/Onset,Context/Setting, Quality, Duration, Modifying Factors, Severity)  Note limiting factors. Crissy Mcfarland is a 54 y.o. female who presents to the emergency department with attribute past medical history of diabetes for chief complaint of right lower side back pain rated 7 out of 10 described as a constant aching sensation since yesterday. Patient reports yesterday she was bending down cleaning around the house and started feeling the pain soon after. She fell approximately a month ago and she had similar complaints. Denies numbness or tingling. Denies loss of bladder bowel control. Denies trouble ambulating. Has no alleviating or modifying factors. Nursing Notes were reviewed. REVIEW OF SYSTEMS    (2-9 systems for level 4, 10 or more for level 5)     Review of Systems   Constitutional: Negative for activity change, appetite change, fatigue and fever. HENT: Negative for congestion, ear pain, rhinorrhea, sore throat and trouble swallowing. Eyes: Negative for pain, discharge and redness. Respiratory: Negative for cough, shortness of breath and wheezing. Cardiovascular: Negative for chest pain and palpitations. Gastrointestinal: Negative for abdominal pain, blood in stool, constipation, diarrhea, nausea and vomiting. Endocrine: Negative for polydipsia and polyuria. Genitourinary: Negative for decreased urine volume, dysuria, flank pain and hematuria. Musculoskeletal: Positive for back pain. Negative for arthralgias and myalgias. Skin: Negative for color change, rash and wound.    Neurological: Negative for dizziness, syncope, weakness, light-headedness and headaches. Psychiatric/Behavioral: Negative for behavioral problems. All other systems reviewed and are negative. Except as noted above the remainder of the review of systems was reviewed and negative. PAST MEDICAL HISTORY     Past Medical History:   Diagnosis Date    Diabetes mellitus ([facility] [address]      Past Surgical History:   Procedure Laterality Date    HERNIA REPAIR       Social History     Socioeconomic History    Marital status: Single     Spouse name: None    Number of children: None    Years of education: None    Highest education level: None   Occupational History    None   Tobacco Use    Smoking status: Current Every Day Smoker     Packs/day: 0.50     Types: Cigarettes    Smokeless tobacco: Never Used   Vaping Use    Vaping Use: Never used   Substance and Sexual Activity    Alcohol use: No    Drug use: No    Sexual activity: None   Other Topics Concern    None   Social History Narrative    None     Social Determinants of Health     Financial Resource Strain:     Difficulty of Paying Living Expenses:    Food Insecurity:     Worried About Running Out of Food in the Last Year:     Ran Out of Food in the Last Year:    Transportation Needs:     Lack of Transportation (Medical):      Lack of Transportation (Non-Medical):    Physical Activity:     Days of Exercise per Week:     Minutes of Exercise per Session:    Stress:     Feeling of Stress :    Social Connections:     Frequency of Communication with Friends and Family:     Frequency of Social Gatherings with Friends and Family:     Attends Scientologist Services:     Active Member of Clubs or Organizations:     Attends Club or Organization Meetings:     Marital Status:    Intimate Partner Violence:     Fear of Current or Ex-Partner:     Emotionally Abused:     Physically Abused:     Sexually Abused:        SCREENINGS             PHYSICAL EXAM    (up to 7 for level 4, 8 or more for level 5)     ED Triage Vitals [07/22/21 0802]   BP Temp Temp Source Pulse Resp SpO2 Height Weight   111/81 98.3 °F (36.8 °C) Oral 88 16 100 % 5' 8\" (1.727 m) 250 lb (113.4 kg)       Physical Exam  Vitals and nursing note reviewed. Constitutional:       General: She is not in acute distress. Appearance: She is well-developed. She is not diaphoretic. HENT:      Head: Normocephalic and atraumatic. Nose: Nose normal.   Eyes:      Conjunctiva/sclera: Conjunctivae normal.      Pupils: Pupils are equal, round, and reactive to light. Cardiovascular:      Rate and Rhythm: Normal rate and regular rhythm. Heart sounds: Normal heart sounds. Pulmonary:      Effort: Pulmonary effort is normal. No respiratory distress. Breath sounds: Normal breath sounds. No wheezing. Abdominal:      General: Bowel sounds are normal.      Palpations: Abdomen is soft. Tenderness: There is no abdominal tenderness. Musculoskeletal:      Cervical back: Normal range of motion and neck supple. Skin:     General: Skin is warm and dry. Capillary Refill: Capillary refill takes less than 2 seconds. Findings: No rash. Comments: Mild tenderness to palpation on the right side of the back and patient has a icy hot patch on it   Neurological:      Mental Status: She is alert and oriented to person, place, and time. Cranial Nerves: No cranial nerve deficit.    Psychiatric:         Behavior: Behavior normal.         RESULTS     EKG: All EKG's are interpreted by the Emergency Department Physician who either signs or Co-signsthis chart in the absence of a cardiologist.        RADIOLOGY:   Non-plain filmimages such as CT, Ultrasound and MRI are read by the radiologist. Plain radiographic images are visualized and preliminarily interpreted by the emergency physician with the below findings:    NAD    Interpretation per the Radiologist below, if available at the time ofthis note:    XR LUMBAR SPINE (MIN 4 VIEWS)   Final Result      MINIMAL DEGENERATIVE CHANGES WITHOUT FRACTURE OR MALALIGNMENT. ED BEDSIDE ULTRASOUND:   Performed by ED Physician - none    LABS:  Labs Reviewed - No data to display    All other labs were within normal range or not returned as of this dictation. EMERGENCY DEPARTMENT COURSE and DIFFERENTIAL DIAGNOSIS/MDM:   Vitals:    Vitals:    07/22/21 0802 07/22/21 0930   BP: 111/81 (!) 119/47   Pulse: 88 70   Resp: 16 19   Temp: 98.3 °F (36.8 °C)    TempSrc: Oral    SpO2: 100% 100%   Weight: 250 lb (113.4 kg)    Height: 5' 8\" (1.727 m)             MDM   Patient is a 40-year-old female presenting the ER with a chief complaint of right-sided back pain. Patient is hemodynamically stable nontoxic-appearing. Patient medicated with Norco, methylprednisone, and Toradol. X-rays obtained and patient will be reevaluated. X-rays normal.  Patient reports some relief. Sent home with [place] and Medrol Dosepak. She is instructed to follow-up with primary care and return back to the ER if worse in any way. Discharged in stable condition with stable vital signs. CRITICAL CARE TIME       CONSULTS:  None    PROCEDURES:  Unless otherwise noted below, none     Procedures    FINAL IMPRESSION      1.  Strain of lumbar region, initial encounter          DISPOSITION/PLAN   DISPOSITION Decision To Discharge 07/22/2021 09:22:43 AM      PATIENT REFERRED TO:  Nigel Tyler DO  [address] [place], [place] [id number] [phone]    Schedule an appointment as soon as possible for a visit in 1 day        DISCHARGE MEDICATIONS:  Discharge Medication List as of 7/22/2021  9:26 AM      START taking these medications    Details   methylPREDNISolone (MEDROL, SAGE,) 4 MG tablet Take by mouth., Disp-1 kit, R-0Print                (Please notethat portions of this note were completed with a voice recognition program.  Efforts were made to edit the dictations but occasionally words are mis-transcribed.)    Ashlee Torres, APRN - CNP (electronically signed)  Attending Emergency Physician          [facility], APRN - CNP  07/23/21 7058

## 2021-07-22 NOTE — ED NOTES
Patient in [facility], RN  07/22/21 [address], RN  07/22/21 6410

## 2021-07-22 NOTE — ED TRIAGE NOTES
Pt presents to ED with c/o lower right sided back pain that started yesterday after cleaning.    Pt denies chest pain, SOB, fever, chills, N/V